# Patient Record
Sex: FEMALE | Race: WHITE | Employment: OTHER | URBAN - METROPOLITAN AREA
[De-identification: names, ages, dates, MRNs, and addresses within clinical notes are randomized per-mention and may not be internally consistent; named-entity substitution may affect disease eponyms.]

---

## 2019-12-18 NOTE — IP AVS SNAPSHOT
[facility]            (For Outpatient Use Only) Initial Admit Date: 9/10/2021   Inpt/Obs Admit Date: Inpt: 9/10/21 / Obs: N/A   Discharge Date:    Shiloh Copier:  [de-identified]   MRN: [de-identified]   CSN: [id number]   CEID: [id number]        ENC Subscriber Name:  Bettye Mercer :    Subscriber ID:  Pt Rel to Subscriber:    Hospital Account Financial Class: Medicare    September 15, 2021 To get better and follow your care plan as instructed.

## 2021-01-01 ENCOUNTER — APPOINTMENT (OUTPATIENT)
Dept: GENERAL RADIOLOGY | Facility: HOSPITAL | Age: 86
DRG: 291 | End: 2021-01-01
Attending: EMERGENCY MEDICINE
Payer: MEDICARE

## 2021-01-01 ENCOUNTER — APPOINTMENT (OUTPATIENT)
Dept: CT IMAGING | Facility: HOSPITAL | Age: 86
End: 2021-01-01
Attending: EMERGENCY MEDICINE
Payer: MEDICARE

## 2021-01-01 ENCOUNTER — APPOINTMENT (OUTPATIENT)
Dept: CT IMAGING | Facility: HOSPITAL | Age: 86
DRG: 536 | End: 2021-01-01
Attending: EMERGENCY MEDICINE
Payer: MEDICARE

## 2021-01-01 ENCOUNTER — APPOINTMENT (OUTPATIENT)
Dept: GENERAL RADIOLOGY | Facility: HOSPITAL | Age: 86
DRG: 536 | End: 2021-01-01
Attending: EMERGENCY MEDICINE
Payer: MEDICARE

## 2021-01-01 ENCOUNTER — HOSPITAL ENCOUNTER (EMERGENCY)
Facility: HOSPITAL | Age: 86
Discharge: HOME | End: 2021-01-01
Attending: EMERGENCY MEDICINE
Payer: MEDICARE

## 2021-01-01 ENCOUNTER — HOSPITAL ENCOUNTER (INPATIENT)
Facility: HOSPITAL | Age: 86
LOS: 1 days | DRG: 291 | End: 2021-01-01
Attending: EMERGENCY MEDICINE | Admitting: INTERNAL MEDICINE
Payer: MEDICARE

## 2021-01-01 ENCOUNTER — APPOINTMENT (OUTPATIENT)
Dept: CT IMAGING | Facility: HOSPITAL | Age: 86
DRG: 291 | End: 2021-01-01
Attending: EMERGENCY MEDICINE
Payer: MEDICARE

## 2021-01-01 ENCOUNTER — HOSPITAL ENCOUNTER (INPATIENT)
Facility: HOSPITAL | Age: 86
LOS: 5 days | Discharge: NURSING, REHAB OR LONG-TERM CARE | DRG: 291 | End: 2021-01-01
Attending: EMERGENCY MEDICINE | Admitting: HOSPITALIST
Payer: MEDICARE

## 2021-01-01 ENCOUNTER — HOSPITAL ENCOUNTER (INPATIENT)
Facility: HOSPITAL | Age: 86
LOS: 4 days | Discharge: NURSING, REHAB OR LONG-TERM CARE | DRG: 536 | End: 2021-01-01
Attending: EMERGENCY MEDICINE | Admitting: HOSPITALIST
Payer: MEDICARE

## 2021-01-01 VITALS
BODY MASS INDEX: 28.46 KG/M2 | RESPIRATION RATE: 16 BRPM | WEIGHT: 141.19 LBS | TEMPERATURE: 99 F | HEIGHT: 59 IN | SYSTOLIC BLOOD PRESSURE: 137 MMHG | HEART RATE: 107 BPM | DIASTOLIC BLOOD PRESSURE: 73 MMHG | OXYGEN SATURATION: 93 %

## 2021-01-01 VITALS
SYSTOLIC BLOOD PRESSURE: 130 MMHG | TEMPERATURE: 98 F | RESPIRATION RATE: 20 BRPM | DIASTOLIC BLOOD PRESSURE: 71 MMHG | OXYGEN SATURATION: 93 % | HEART RATE: 80 BPM

## 2021-01-01 VITALS
BODY MASS INDEX: 30 KG/M2 | WEIGHT: 146.5 LBS | RESPIRATION RATE: 16 BRPM | TEMPERATURE: 98 F | HEART RATE: 70 BPM | SYSTOLIC BLOOD PRESSURE: 146 MMHG | DIASTOLIC BLOOD PRESSURE: 76 MMHG | OXYGEN SATURATION: 96 %

## 2021-01-01 VITALS
WEIGHT: 135 LBS | TEMPERATURE: 98 F | BODY MASS INDEX: 27.21 KG/M2 | OXYGEN SATURATION: 98 % | SYSTOLIC BLOOD PRESSURE: 151 MMHG | RESPIRATION RATE: 20 BRPM | HEIGHT: 59 IN | DIASTOLIC BLOOD PRESSURE: 71 MMHG | HEART RATE: 71 BPM

## 2021-01-01 DIAGNOSIS — N39.0 URINARY TRACT INFECTION WITHOUT HEMATURIA, SITE UNSPECIFIED: ICD-10-CM

## 2021-01-01 DIAGNOSIS — R09.02 HYPOXIA: Primary | ICD-10-CM

## 2021-01-01 DIAGNOSIS — N28.9 ACUTE ON CHRONIC RENAL INSUFFICIENCY: ICD-10-CM

## 2021-01-01 DIAGNOSIS — S32.509A CLOSED FRACTURE OF PUBIS, UNSPECIFIED PORTION OF PUBIS, UNSPECIFIED LATERALITY, INITIAL ENCOUNTER (HCC): Primary | ICD-10-CM

## 2021-01-01 DIAGNOSIS — I50.9 ACUTE ON CHRONIC CONGESTIVE HEART FAILURE, UNSPECIFIED HEART FAILURE TYPE (HCC): ICD-10-CM

## 2021-01-01 DIAGNOSIS — D64.9 ANEMIA, UNSPECIFIED TYPE: ICD-10-CM

## 2021-01-01 DIAGNOSIS — N18.9 ACUTE ON CHRONIC RENAL INSUFFICIENCY: ICD-10-CM

## 2021-01-01 DIAGNOSIS — J96.21 ACUTE ON CHRONIC RESPIRATORY FAILURE WITH HYPOXIA (HCC): Primary | ICD-10-CM

## 2021-01-01 DIAGNOSIS — J90 PLEURAL EFFUSION: ICD-10-CM

## 2021-01-01 DIAGNOSIS — R31.9 HEMATURIA, UNSPECIFIED TYPE: ICD-10-CM

## 2021-01-01 DIAGNOSIS — S70.01XA CONTUSION OF RIGHT HIP, INITIAL ENCOUNTER: ICD-10-CM

## 2021-01-01 DIAGNOSIS — N30.91 HEMORRHAGIC CYSTITIS: Primary | ICD-10-CM

## 2021-01-01 LAB
ALBUMIN SERPL-MCNC: 2.7 G/DL (ref 3.4–5)
ALBUMIN SERPL-MCNC: 3.1 G/DL (ref 3.4–5)
ALBUMIN/GLOB SERPL: 0.7 {RATIO} (ref 1–2)
ALBUMIN/GLOB SERPL: 0.7 {RATIO} (ref 1–2)
ALP LIVER SERPL-CCNC: 101 U/L
ALP LIVER SERPL-CCNC: 302 U/L
ALT SERPL-CCNC: 15 U/L
ALT SERPL-CCNC: 32 U/L
ANION GAP SERPL CALC-SCNC: 2 MMOL/L (ref 0–18)
ANION GAP SERPL CALC-SCNC: 3 MMOL/L (ref 0–18)
ANION GAP SERPL CALC-SCNC: 4 MMOL/L (ref 0–18)
ANION GAP SERPL CALC-SCNC: 5 MMOL/L (ref 0–18)
ANION GAP SERPL CALC-SCNC: 6 MMOL/L (ref 0–18)
ANION GAP SERPL CALC-SCNC: 7 MMOL/L (ref 0–18)
ANION GAP SERPL CALC-SCNC: 8 MMOL/L (ref 0–18)
ANION GAP SERPL CALC-SCNC: 9 MMOL/L (ref 0–18)
ANTIBODY SCREEN: NEGATIVE
AST SERPL-CCNC: 18 U/L (ref 15–37)
AST SERPL-CCNC: 31 U/L (ref 15–37)
BASOPHILS # BLD AUTO: 0.03 X10(3) UL (ref 0–0.2)
BASOPHILS # BLD AUTO: 0.04 X10(3) UL (ref 0–0.2)
BASOPHILS # BLD AUTO: 0.05 X10(3) UL (ref 0–0.2)
BASOPHILS NFR BLD AUTO: 0.4 %
BASOPHILS NFR BLD AUTO: 0.5 %
BASOPHILS NFR BLD AUTO: 0.6 %
BASOPHILS NFR BLD AUTO: 0.6 %
BASOPHILS NFR BLD AUTO: 0.9 %
BILIRUB SERPL-MCNC: 0.3 MG/DL (ref 0.1–2)
BILIRUB SERPL-MCNC: 0.4 MG/DL (ref 0.1–2)
BILIRUB UR QL: NEGATIVE
BLOOD TYPE BARCODE: 7300
BLOOD TYPE BARCODE: 7300
BUN BLD-MCNC: 101 MG/DL (ref 7–18)
BUN BLD-MCNC: 116 MG/DL (ref 7–18)
BUN BLD-MCNC: 124 MG/DL (ref 7–18)
BUN BLD-MCNC: 139 MG/DL (ref 7–18)
BUN BLD-MCNC: 41 MG/DL (ref 7–18)
BUN BLD-MCNC: 43 MG/DL (ref 7–18)
BUN BLD-MCNC: 46 MG/DL (ref 7–18)
BUN BLD-MCNC: 49 MG/DL (ref 7–18)
BUN BLD-MCNC: 51 MG/DL (ref 7–18)
BUN BLD-MCNC: 52 MG/DL (ref 7–18)
BUN BLD-MCNC: 75 MG/DL (ref 7–18)
BUN BLD-MCNC: 94 MG/DL (ref 7–18)
BUN/CREAT SERPL: 30.8 (ref 10–20)
BUN/CREAT SERPL: 30.9 (ref 10–20)
BUN/CREAT SERPL: 31.5 (ref 10–20)
BUN/CREAT SERPL: 33.5 (ref 10–20)
BUN/CREAT SERPL: 34 (ref 10–20)
BUN/CREAT SERPL: 34.7 (ref 10–20)
BUN/CREAT SERPL: 44.9 (ref 10–20)
BUN/CREAT SERPL: 47.9 (ref 10–20)
BUN/CREAT SERPL: 52.5 (ref 10–20)
BUN/CREAT SERPL: 53.2 (ref 10–20)
BUN/CREAT SERPL: 55.6 (ref 10–20)
BUN/CREAT SERPL: 56.9 (ref 10–20)
CALCIUM BLD-MCNC: 7.9 MG/DL (ref 8.5–10.1)
CALCIUM BLD-MCNC: 8 MG/DL (ref 8.5–10.1)
CALCIUM BLD-MCNC: 8.1 MG/DL (ref 8.5–10.1)
CALCIUM BLD-MCNC: 8.1 MG/DL (ref 8.5–10.1)
CALCIUM BLD-MCNC: 8.3 MG/DL (ref 8.5–10.1)
CALCIUM BLD-MCNC: 8.4 MG/DL (ref 8.5–10.1)
CALCIUM BLD-MCNC: 8.4 MG/DL (ref 8.5–10.1)
CALCIUM BLD-MCNC: 8.5 MG/DL (ref 8.5–10.1)
CALCIUM BLD-MCNC: 8.7 MG/DL (ref 8.5–10.1)
CALCIUM BLD-MCNC: 8.8 MG/DL (ref 8.5–10.1)
CHLORIDE SERPL-SCNC: 101 MMOL/L (ref 98–112)
CHLORIDE SERPL-SCNC: 104 MMOL/L (ref 98–112)
CHLORIDE SERPL-SCNC: 105 MMOL/L (ref 98–112)
CHLORIDE SERPL-SCNC: 105 MMOL/L (ref 98–112)
CHLORIDE SERPL-SCNC: 107 MMOL/L (ref 98–112)
CHLORIDE SERPL-SCNC: 108 MMOL/L (ref 98–112)
CHLORIDE SERPL-SCNC: 93 MMOL/L (ref 98–112)
CHLORIDE SERPL-SCNC: 97 MMOL/L (ref 98–112)
CLARITY UR: CLEAR
CO2 SERPL-SCNC: 23 MMOL/L (ref 21–32)
CO2 SERPL-SCNC: 27 MMOL/L (ref 21–32)
CO2 SERPL-SCNC: 30 MMOL/L (ref 21–32)
CO2 SERPL-SCNC: 31 MMOL/L (ref 21–32)
CO2 SERPL-SCNC: 32 MMOL/L (ref 21–32)
CO2 SERPL-SCNC: 32 MMOL/L (ref 21–32)
CO2 SERPL-SCNC: 33 MMOL/L (ref 21–32)
CO2 SERPL-SCNC: 34 MMOL/L (ref 21–32)
CO2 SERPL-SCNC: 34 MMOL/L (ref 21–32)
CO2 SERPL-SCNC: 35 MMOL/L (ref 21–32)
COLOR UR: YELLOW
COLOR UR: YELLOW
CREAT BLD-MCNC: 1.24 MG/DL
CREAT BLD-MCNC: 1.33 MG/DL
CREAT BLD-MCNC: 1.44 MG/DL
CREAT BLD-MCNC: 1.49 MG/DL
CREAT BLD-MCNC: 1.55 MG/DL
CREAT BLD-MCNC: 1.62 MG/DL
CREAT BLD-MCNC: 1.67 MG/DL
CREAT BLD-MCNC: 1.79 MG/DL
CREAT BLD-MCNC: 2.11 MG/DL
CREAT BLD-MCNC: 2.18 MG/DL
CREAT BLD-MCNC: 2.18 MG/DL
CREAT BLD-MCNC: 2.5 MG/DL
DEPRECATED RDW RBC AUTO: 60.3 FL (ref 35.1–46.3)
DEPRECATED RDW RBC AUTO: 60.8 FL (ref 35.1–46.3)
DEPRECATED RDW RBC AUTO: 61.2 FL (ref 35.1–46.3)
DEPRECATED RDW RBC AUTO: 62.5 FL (ref 35.1–46.3)
DEPRECATED RDW RBC AUTO: 63.5 FL (ref 35.1–46.3)
DEPRECATED RDW RBC AUTO: 66.4 FL (ref 35.1–46.3)
DEPRECATED RDW RBC AUTO: 71.6 FL (ref 35.1–46.3)
DEPRECATED RDW RBC AUTO: 73.8 FL (ref 35.1–46.3)
DEPRECATED RDW RBC AUTO: 74.9 FL (ref 35.1–46.3)
DEPRECATED RDW RBC AUTO: 78.6 FL (ref 35.1–46.3)
DEPRECATED RDW RBC AUTO: 78.9 FL (ref 35.1–46.3)
EOSINOPHIL # BLD AUTO: 0.02 X10(3) UL (ref 0–0.7)
EOSINOPHIL # BLD AUTO: 0.03 X10(3) UL (ref 0–0.7)
EOSINOPHIL # BLD AUTO: 0.07 X10(3) UL (ref 0–0.7)
EOSINOPHIL # BLD AUTO: 0.09 X10(3) UL (ref 0–0.7)
EOSINOPHIL # BLD AUTO: 0.14 X10(3) UL (ref 0–0.7)
EOSINOPHIL # BLD AUTO: 0.14 X10(3) UL (ref 0–0.7)
EOSINOPHIL # BLD AUTO: 0.19 X10(3) UL (ref 0–0.7)
EOSINOPHIL # BLD AUTO: 0.21 X10(3) UL (ref 0–0.7)
EOSINOPHIL NFR BLD AUTO: 0.2 %
EOSINOPHIL NFR BLD AUTO: 0.4 %
EOSINOPHIL NFR BLD AUTO: 1 %
EOSINOPHIL NFR BLD AUTO: 1.2 %
EOSINOPHIL NFR BLD AUTO: 1.9 %
EOSINOPHIL NFR BLD AUTO: 2.1 %
EOSINOPHIL NFR BLD AUTO: 3.2 %
EOSINOPHIL NFR BLD AUTO: 3.6 %
ERYTHROCYTE [DISTWIDTH] IN BLOOD BY AUTOMATED COUNT: 18.6 % (ref 11–15)
ERYTHROCYTE [DISTWIDTH] IN BLOOD BY AUTOMATED COUNT: 18.8 % (ref 11–15)
ERYTHROCYTE [DISTWIDTH] IN BLOOD BY AUTOMATED COUNT: 18.8 % (ref 11–15)
ERYTHROCYTE [DISTWIDTH] IN BLOOD BY AUTOMATED COUNT: 18.9 % (ref 11–15)
ERYTHROCYTE [DISTWIDTH] IN BLOOD BY AUTOMATED COUNT: 19.4 % (ref 11–15)
ERYTHROCYTE [DISTWIDTH] IN BLOOD BY AUTOMATED COUNT: 19.5 % (ref 11–15)
ERYTHROCYTE [DISTWIDTH] IN BLOOD BY AUTOMATED COUNT: 19.9 % (ref 11–15)
ERYTHROCYTE [DISTWIDTH] IN BLOOD BY AUTOMATED COUNT: 20.2 % (ref 11–15)
ERYTHROCYTE [DISTWIDTH] IN BLOOD BY AUTOMATED COUNT: 20.9 % (ref 11–15)
ERYTHROCYTE [DISTWIDTH] IN BLOOD BY AUTOMATED COUNT: 20.9 % (ref 11–15)
ERYTHROCYTE [DISTWIDTH] IN BLOOD BY AUTOMATED COUNT: 21.1 % (ref 11–15)
EST. AVERAGE GLUCOSE BLD GHB EST-MCNC: 189 MG/DL (ref 68–126)
EST. AVERAGE GLUCOSE BLD GHB EST-MCNC: 189 MG/DL (ref 68–126)
GLOBULIN PLAS-MCNC: 4.1 G/DL (ref 2.8–4.4)
GLOBULIN PLAS-MCNC: 4.7 G/DL (ref 2.8–4.4)
GLUCOSE BLD-MCNC: 116 MG/DL (ref 70–99)
GLUCOSE BLD-MCNC: 128 MG/DL (ref 70–99)
GLUCOSE BLD-MCNC: 129 MG/DL (ref 70–99)
GLUCOSE BLD-MCNC: 134 MG/DL (ref 70–99)
GLUCOSE BLD-MCNC: 141 MG/DL (ref 70–99)
GLUCOSE BLD-MCNC: 150 MG/DL (ref 70–99)
GLUCOSE BLD-MCNC: 152 MG/DL (ref 70–99)
GLUCOSE BLD-MCNC: 159 MG/DL (ref 70–99)
GLUCOSE BLD-MCNC: 174 MG/DL (ref 70–99)
GLUCOSE BLD-MCNC: 181 MG/DL (ref 70–99)
GLUCOSE BLD-MCNC: 225 MG/DL (ref 70–99)
GLUCOSE BLD-MCNC: 267 MG/DL (ref 70–99)
GLUCOSE BLDC GLUCOMTR-MCNC: 118 MG/DL (ref 70–99)
GLUCOSE BLDC GLUCOMTR-MCNC: 125 MG/DL (ref 70–99)
GLUCOSE BLDC GLUCOMTR-MCNC: 126 MG/DL (ref 70–99)
GLUCOSE BLDC GLUCOMTR-MCNC: 130 MG/DL (ref 70–99)
GLUCOSE BLDC GLUCOMTR-MCNC: 131 MG/DL (ref 70–99)
GLUCOSE BLDC GLUCOMTR-MCNC: 131 MG/DL (ref 70–99)
GLUCOSE BLDC GLUCOMTR-MCNC: 134 MG/DL (ref 70–99)
GLUCOSE BLDC GLUCOMTR-MCNC: 143 MG/DL (ref 70–99)
GLUCOSE BLDC GLUCOMTR-MCNC: 144 MG/DL (ref 70–99)
GLUCOSE BLDC GLUCOMTR-MCNC: 155 MG/DL (ref 70–99)
GLUCOSE BLDC GLUCOMTR-MCNC: 164 MG/DL (ref 70–99)
GLUCOSE BLDC GLUCOMTR-MCNC: 166 MG/DL (ref 70–99)
GLUCOSE BLDC GLUCOMTR-MCNC: 175 MG/DL (ref 70–99)
GLUCOSE BLDC GLUCOMTR-MCNC: 193 MG/DL (ref 70–99)
GLUCOSE BLDC GLUCOMTR-MCNC: 194 MG/DL (ref 70–99)
GLUCOSE BLDC GLUCOMTR-MCNC: 195 MG/DL (ref 70–99)
GLUCOSE BLDC GLUCOMTR-MCNC: 195 MG/DL (ref 70–99)
GLUCOSE BLDC GLUCOMTR-MCNC: 209 MG/DL (ref 70–99)
GLUCOSE BLDC GLUCOMTR-MCNC: 217 MG/DL (ref 70–99)
GLUCOSE BLDC GLUCOMTR-MCNC: 219 MG/DL (ref 70–99)
GLUCOSE BLDC GLUCOMTR-MCNC: 224 MG/DL (ref 70–99)
GLUCOSE BLDC GLUCOMTR-MCNC: 226 MG/DL (ref 70–99)
GLUCOSE BLDC GLUCOMTR-MCNC: 227 MG/DL (ref 70–99)
GLUCOSE BLDC GLUCOMTR-MCNC: 232 MG/DL (ref 70–99)
GLUCOSE BLDC GLUCOMTR-MCNC: 237 MG/DL (ref 70–99)
GLUCOSE BLDC GLUCOMTR-MCNC: 237 MG/DL (ref 70–99)
GLUCOSE BLDC GLUCOMTR-MCNC: 251 MG/DL (ref 70–99)
GLUCOSE BLDC GLUCOMTR-MCNC: 252 MG/DL (ref 70–99)
GLUCOSE BLDC GLUCOMTR-MCNC: 258 MG/DL (ref 70–99)
GLUCOSE BLDC GLUCOMTR-MCNC: 260 MG/DL (ref 70–99)
GLUCOSE BLDC GLUCOMTR-MCNC: 279 MG/DL (ref 70–99)
GLUCOSE BLDC GLUCOMTR-MCNC: 286 MG/DL (ref 70–99)
GLUCOSE BLDC GLUCOMTR-MCNC: 291 MG/DL (ref 70–99)
GLUCOSE BLDC GLUCOMTR-MCNC: 321 MG/DL (ref 70–99)
GLUCOSE BLDC GLUCOMTR-MCNC: 351 MG/DL (ref 70–99)
GLUCOSE BLDC GLUCOMTR-MCNC: 355 MG/DL (ref 70–99)
GLUCOSE BLDC GLUCOMTR-MCNC: 379 MG/DL (ref 70–99)
GLUCOSE UR-MCNC: 50 MG/DL
GLUCOSE UR-MCNC: 50 MG/DL
GLUCOSE UR-MCNC: NEGATIVE MG/DL
HAV IGM SER QL: 2.3 MG/DL (ref 1.6–2.6)
HBA1C MFR BLD HPLC: 8.2 % (ref ?–5.7)
HBA1C MFR BLD HPLC: 8.2 % (ref ?–5.7)
HCT VFR BLD AUTO: 21.8 %
HCT VFR BLD AUTO: 23.3 %
HCT VFR BLD AUTO: 25.7 %
HCT VFR BLD AUTO: 28.3 %
HCT VFR BLD AUTO: 28.3 %
HCT VFR BLD AUTO: 29.4 %
HCT VFR BLD AUTO: 29.7 %
HCT VFR BLD AUTO: 31.3 %
HCT VFR BLD AUTO: 31.3 %
HCT VFR BLD AUTO: 31.6 %
HCT VFR BLD AUTO: 32.5 %
HEMOCCULT STL QL: NEGATIVE
HGB BLD-MCNC: 6.2 G/DL
HGB BLD-MCNC: 6.4 G/DL
HGB BLD-MCNC: 7 G/DL
HGB BLD-MCNC: 7 G/DL
HGB BLD-MCNC: 7.6 G/DL
HGB BLD-MCNC: 8 G/DL
HGB BLD-MCNC: 8.4 G/DL
HGB BLD-MCNC: 8.5 G/DL
HGB BLD-MCNC: 8.6 G/DL
HGB BLD-MCNC: 8.6 G/DL
HGB BLD-MCNC: 8.7 G/DL
HGB BLD-MCNC: 8.9 G/DL
HGB BLD-MCNC: 9.2 G/DL
HGB BLD-MCNC: 9.2 G/DL
HGB BLD-MCNC: 9.3 G/DL
HGB BLD-MCNC: 9.4 G/DL
IMM GRANULOCYTES # BLD AUTO: 0.01 X10(3) UL (ref 0–1)
IMM GRANULOCYTES # BLD AUTO: 0.02 X10(3) UL (ref 0–1)
IMM GRANULOCYTES # BLD AUTO: 0.03 X10(3) UL (ref 0–1)
IMM GRANULOCYTES # BLD AUTO: 0.05 X10(3) UL (ref 0–1)
IMM GRANULOCYTES NFR BLD: 0.2 %
IMM GRANULOCYTES NFR BLD: 0.3 %
IMM GRANULOCYTES NFR BLD: 0.4 %
IMM GRANULOCYTES NFR BLD: 0.5 %
KETONES UR-MCNC: NEGATIVE MG/DL
LACTATE SERPL-SCNC: 0.8 MMOL/L (ref 0.4–2)
LYMPHOCYTES # BLD AUTO: 0.51 X10(3) UL (ref 1–4)
LYMPHOCYTES # BLD AUTO: 0.77 X10(3) UL (ref 1–4)
LYMPHOCYTES # BLD AUTO: 0.77 X10(3) UL (ref 1–4)
LYMPHOCYTES # BLD AUTO: 0.78 X10(3) UL (ref 1–4)
LYMPHOCYTES # BLD AUTO: 0.82 X10(3) UL (ref 1–4)
LYMPHOCYTES # BLD AUTO: 0.85 X10(3) UL (ref 1–4)
LYMPHOCYTES # BLD AUTO: 0.94 X10(3) UL (ref 1–4)
LYMPHOCYTES # BLD AUTO: 1.15 X10(3) UL (ref 1–4)
LYMPHOCYTES NFR BLD AUTO: 11 %
LYMPHOCYTES NFR BLD AUTO: 11.1 %
LYMPHOCYTES NFR BLD AUTO: 11.2 %
LYMPHOCYTES NFR BLD AUTO: 11.8 %
LYMPHOCYTES NFR BLD AUTO: 13.1 %
LYMPHOCYTES NFR BLD AUTO: 16 %
LYMPHOCYTES NFR BLD AUTO: 16.9 %
LYMPHOCYTES NFR BLD AUTO: 4.8 %
M PROTEIN MFR SERPL ELPH: 6.8 G/DL (ref 6.4–8.2)
M PROTEIN MFR SERPL ELPH: 7.8 G/DL (ref 6.4–8.2)
MAGNESIUM SERPL-MCNC: 2.4 MG/DL (ref 1.6–2.6)
MAGNESIUM SERPL-MCNC: 2.4 MG/DL (ref 1.6–2.6)
MAGNESIUM SERPL-MCNC: 2.5 MG/DL (ref 1.6–2.6)
MAGNESIUM SERPL-MCNC: 2.6 MG/DL (ref 1.6–2.6)
MCH RBC QN AUTO: 26.6 PG (ref 26–34)
MCH RBC QN AUTO: 26.8 PG (ref 26–34)
MCH RBC QN AUTO: 26.9 PG (ref 26–34)
MCH RBC QN AUTO: 26.9 PG (ref 26–34)
MCH RBC QN AUTO: 27.2 PG (ref 26–34)
MCH RBC QN AUTO: 27.4 PG (ref 26–34)
MCH RBC QN AUTO: 28.2 PG (ref 26–34)
MCH RBC QN AUTO: 29.2 PG (ref 26–34)
MCH RBC QN AUTO: 29.3 PG (ref 26–34)
MCH RBC QN AUTO: 29.3 PG (ref 26–34)
MCH RBC QN AUTO: 29.7 PG (ref 26–34)
MCHC RBC AUTO-ENTMCNC: 28.9 G/DL (ref 31–37)
MCHC RBC AUTO-ENTMCNC: 29.1 G/DL (ref 31–37)
MCHC RBC AUTO-ENTMCNC: 29.3 G/DL (ref 31–37)
MCHC RBC AUTO-ENTMCNC: 29.3 G/DL (ref 31–37)
MCHC RBC AUTO-ENTMCNC: 29.4 G/DL (ref 31–37)
MCHC RBC AUTO-ENTMCNC: 29.4 G/DL (ref 31–37)
MCHC RBC AUTO-ENTMCNC: 29.6 G/DL (ref 31–37)
MCHC RBC AUTO-ENTMCNC: 29.7 G/DL (ref 31–37)
MCHC RBC AUTO-ENTMCNC: 29.7 G/DL (ref 31–37)
MCHC RBC AUTO-ENTMCNC: 30 G/DL (ref 31–37)
MCHC RBC AUTO-ENTMCNC: 30.4 G/DL (ref 31–37)
MCV RBC AUTO: 100.9 FL
MCV RBC AUTO: 101.9 FL
MCV RBC AUTO: 89.6 FL
MCV RBC AUTO: 90.1 FL
MCV RBC AUTO: 90.5 FL
MCV RBC AUTO: 90.5 FL
MCV RBC AUTO: 91.6 FL
MCV RBC AUTO: 91.9 FL
MCV RBC AUTO: 96.4 FL
MCV RBC AUTO: 98.7 FL
MCV RBC AUTO: 99.7 FL
MONOCYTES # BLD AUTO: 0.69 X10(3) UL (ref 0.1–1)
MONOCYTES # BLD AUTO: 0.78 X10(3) UL (ref 0.1–1)
MONOCYTES # BLD AUTO: 0.79 X10(3) UL (ref 0.1–1)
MONOCYTES # BLD AUTO: 0.81 X10(3) UL (ref 0.1–1)
MONOCYTES # BLD AUTO: 0.82 X10(3) UL (ref 0.1–1)
MONOCYTES # BLD AUTO: 0.84 X10(3) UL (ref 0.1–1)
MONOCYTES # BLD AUTO: 0.94 X10(3) UL (ref 0.1–1)
MONOCYTES # BLD AUTO: 0.96 X10(3) UL (ref 0.1–1)
MONOCYTES NFR BLD AUTO: 10.9 %
MONOCYTES NFR BLD AUTO: 11.9 %
MONOCYTES NFR BLD AUTO: 12.1 %
MONOCYTES NFR BLD AUTO: 12.7 %
MONOCYTES NFR BLD AUTO: 13.3 %
MONOCYTES NFR BLD AUTO: 14 %
MONOCYTES NFR BLD AUTO: 9 %
MONOCYTES NFR BLD AUTO: 9.9 %
NEUTROPHILS # BLD AUTO: 3.89 X10 (3) UL (ref 1.5–7.7)
NEUTROPHILS # BLD AUTO: 3.89 X10(3) UL (ref 1.5–7.7)
NEUTROPHILS # BLD AUTO: 4.02 X10 (3) UL (ref 1.5–7.7)
NEUTROPHILS # BLD AUTO: 4.02 X10(3) UL (ref 1.5–7.7)
NEUTROPHILS # BLD AUTO: 4.64 X10 (3) UL (ref 1.5–7.7)
NEUTROPHILS # BLD AUTO: 4.64 X10(3) UL (ref 1.5–7.7)
NEUTROPHILS # BLD AUTO: 5.25 X10 (3) UL (ref 1.5–7.7)
NEUTROPHILS # BLD AUTO: 5.25 X10(3) UL (ref 1.5–7.7)
NEUTROPHILS # BLD AUTO: 5.36 X10 (3) UL (ref 1.5–7.7)
NEUTROPHILS # BLD AUTO: 5.36 X10(3) UL (ref 1.5–7.7)
NEUTROPHILS # BLD AUTO: 5.38 X10 (3) UL (ref 1.5–7.7)
NEUTROPHILS # BLD AUTO: 5.38 X10(3) UL (ref 1.5–7.7)
NEUTROPHILS # BLD AUTO: 5.52 X10 (3) UL (ref 1.5–7.7)
NEUTROPHILS # BLD AUTO: 5.52 X10(3) UL (ref 1.5–7.7)
NEUTROPHILS # BLD AUTO: 9.11 X10 (3) UL (ref 1.5–7.7)
NEUTROPHILS # BLD AUTO: 9.11 X10(3) UL (ref 1.5–7.7)
NEUTROPHILS NFR BLD AUTO: 66.4 %
NEUTROPHILS NFR BLD AUTO: 68.2 %
NEUTROPHILS NFR BLD AUTO: 68.5 %
NEUTROPHILS NFR BLD AUTO: 74.3 %
NEUTROPHILS NFR BLD AUTO: 74.4 %
NEUTROPHILS NFR BLD AUTO: 75.7 %
NEUTROPHILS NFR BLD AUTO: 77.1 %
NEUTROPHILS NFR BLD AUTO: 85.1 %
NITRITE UR QL STRIP.AUTO: NEGATIVE
NITRITE UR QL STRIP.AUTO: NEGATIVE
NITRITE UR QL STRIP.AUTO: POSITIVE
NT-PROBNP SERPL-MCNC: 8125 PG/ML (ref ?–450)
OSMOLALITY SERPL CALC.SUM OF ELEC: 299 MOSM/KG (ref 275–295)
OSMOLALITY SERPL CALC.SUM OF ELEC: 305 MOSM/KG (ref 275–295)
OSMOLALITY SERPL CALC.SUM OF ELEC: 308 MOSM/KG (ref 275–295)
OSMOLALITY SERPL CALC.SUM OF ELEC: 309 MOSM/KG (ref 275–295)
OSMOLALITY SERPL CALC.SUM OF ELEC: 314 MOSM/KG (ref 275–295)
OSMOLALITY SERPL CALC.SUM OF ELEC: 316 MOSM/KG (ref 275–295)
OSMOLALITY SERPL CALC.SUM OF ELEC: 317 MOSM/KG (ref 275–295)
OSMOLALITY SERPL CALC.SUM OF ELEC: 319 MOSM/KG (ref 275–295)
OSMOLALITY SERPL CALC.SUM OF ELEC: 330 MOSM/KG (ref 275–295)
OSMOLALITY SERPL CALC.SUM OF ELEC: 332 MOSM/KG (ref 275–295)
PH UR: 6 [PH] (ref 5–8)
PH UR: 7 [PH] (ref 5–8)
PH UR: 7 [PH] (ref 5–8)
PLATELET # BLD AUTO: 226 10(3)UL (ref 150–450)
PLATELET # BLD AUTO: 229 10(3)UL (ref 150–450)
PLATELET # BLD AUTO: 258 10(3)UL (ref 150–450)
PLATELET # BLD AUTO: 263 10(3)UL (ref 150–450)
PLATELET # BLD AUTO: 268 10(3)UL (ref 150–450)
PLATELET # BLD AUTO: 315 10(3)UL (ref 150–450)
PLATELET # BLD AUTO: 324 10(3)UL (ref 150–450)
PLATELET # BLD AUTO: 335 10(3)UL (ref 150–450)
PLATELET # BLD AUTO: 337 10(3)UL (ref 150–450)
PLATELET # BLD AUTO: 372 10(3)UL (ref 150–450)
PLATELET # BLD AUTO: 434 10(3)UL (ref 150–450)
PLATELET MORPHOLOGY: NORMAL
POTASSIUM SERPL-SCNC: 3.4 MMOL/L (ref 3.5–5.1)
POTASSIUM SERPL-SCNC: 3.8 MMOL/L (ref 3.5–5.1)
POTASSIUM SERPL-SCNC: 4.3 MMOL/L (ref 3.5–5.1)
POTASSIUM SERPL-SCNC: 4.5 MMOL/L (ref 3.5–5.1)
POTASSIUM SERPL-SCNC: 4.6 MMOL/L (ref 3.5–5.1)
POTASSIUM SERPL-SCNC: 4.6 MMOL/L (ref 3.5–5.1)
POTASSIUM SERPL-SCNC: 4.7 MMOL/L (ref 3.5–5.1)
POTASSIUM SERPL-SCNC: 5.1 MMOL/L (ref 3.5–5.1)
POTASSIUM SERPL-SCNC: 5.1 MMOL/L (ref 3.5–5.1)
PROCALCITONIN SERPL-MCNC: 0.26 NG/ML (ref ?–0.16)
PROT UR-MCNC: 100 MG/DL
RBC # BLD AUTO: 2.41 X10(6)UL
RBC # BLD AUTO: 2.6 X10(6)UL
RBC # BLD AUTO: 2.84 X10(6)UL
RBC # BLD AUTO: 3.08 X10(6)UL
RBC # BLD AUTO: 3.09 X10(6)UL
RBC # BLD AUTO: 3.1 X10(6)UL
RBC # BLD AUTO: 3.14 X10(6)UL
RBC # BLD AUTO: 3.14 X10(6)UL
RBC # BLD AUTO: 3.17 X10(6)UL
RBC # BLD AUTO: 3.2 X10(6)UL
RBC # BLD AUTO: 3.22 X10(6)UL
RBC #/AREA URNS AUTO: >10 /HPF
RBC #/AREA URNS AUTO: >10 /HPF
RH BLOOD TYPE: POSITIVE
SARS-COV-2 RNA RESP QL NAA+PROBE: NOT DETECTED
SARS-COV-2 RNA RESP QL NAA+PROBE: NOT DETECTED
SODIUM SERPL-SCNC: 134 MMOL/L (ref 136–145)
SODIUM SERPL-SCNC: 136 MMOL/L (ref 136–145)
SODIUM SERPL-SCNC: 137 MMOL/L (ref 136–145)
SODIUM SERPL-SCNC: 138 MMOL/L (ref 136–145)
SODIUM SERPL-SCNC: 140 MMOL/L (ref 136–145)
SODIUM SERPL-SCNC: 141 MMOL/L (ref 136–145)
SODIUM SERPL-SCNC: 142 MMOL/L (ref 136–145)
SP GR UR STRIP: 1.01 (ref 1–1.03)
UROBILINOGEN UR STRIP-ACNC: <2
VIT B12 SERPL-MCNC: 1939 PG/ML (ref 193–986)
WBC # BLD AUTO: 10.7 X10(3) UL (ref 4–11)
WBC # BLD AUTO: 5.9 X10(3) UL (ref 4–11)
WBC # BLD AUTO: 5.9 X10(3) UL (ref 4–11)
WBC # BLD AUTO: 6.8 X10(3) UL (ref 4–11)
WBC # BLD AUTO: 6.9 X10(3) UL (ref 4–11)
WBC # BLD AUTO: 7 X10(3) UL (ref 4–11)
WBC # BLD AUTO: 7.1 X10(3) UL (ref 4–11)
WBC # BLD AUTO: 7.2 X10(3) UL (ref 4–11)
WBC # BLD AUTO: 7.4 X10(3) UL (ref 4–11)
WBC # BLD AUTO: 8.4 X10(3) UL (ref 4–11)
WBC # BLD AUTO: 9.2 X10(3) UL (ref 4–11)
WBC #/AREA URNS AUTO: >50 /HPF

## 2021-01-01 PROCEDURE — 87186 SC STD MICRODIL/AGAR DIL: CPT | Performed by: HOSPITALIST

## 2021-01-01 PROCEDURE — 85027 COMPLETE CBC AUTOMATED: CPT | Performed by: NURSE PRACTITIONER

## 2021-01-01 PROCEDURE — 36430 TRANSFUSION BLD/BLD COMPNT: CPT

## 2021-01-01 PROCEDURE — 80048 BASIC METABOLIC PNL TOTAL CA: CPT | Performed by: HOSPITALIST

## 2021-01-01 PROCEDURE — 85025 COMPLETE CBC W/AUTO DIFF WBC: CPT | Performed by: HOSPITALIST

## 2021-01-01 PROCEDURE — 73502 X-RAY EXAM HIP UNI 2-3 VIEWS: CPT | Performed by: EMERGENCY MEDICINE

## 2021-01-01 PROCEDURE — 83036 HEMOGLOBIN GLYCOSYLATED A1C: CPT | Performed by: HOSPITALIST

## 2021-01-01 PROCEDURE — 82962 GLUCOSE BLOOD TEST: CPT

## 2021-01-01 PROCEDURE — 87086 URINE CULTURE/COLONY COUNT: CPT | Performed by: EMERGENCY MEDICINE

## 2021-01-01 PROCEDURE — 97535 SELF CARE MNGMENT TRAINING: CPT

## 2021-01-01 PROCEDURE — 85018 HEMOGLOBIN: CPT | Performed by: HOSPITALIST

## 2021-01-01 PROCEDURE — 96374 THER/PROPH/DIAG INJ IV PUSH: CPT

## 2021-01-01 PROCEDURE — 97165 OT EVAL LOW COMPLEX 30 MIN: CPT

## 2021-01-01 PROCEDURE — 86850 RBC ANTIBODY SCREEN: CPT | Performed by: EMERGENCY MEDICINE

## 2021-01-01 PROCEDURE — 80048 BASIC METABOLIC PNL TOTAL CA: CPT | Performed by: EMERGENCY MEDICINE

## 2021-01-01 PROCEDURE — 99284 EMERGENCY DEPT VISIT MOD MDM: CPT

## 2021-01-01 PROCEDURE — 97530 THERAPEUTIC ACTIVITIES: CPT

## 2021-01-01 PROCEDURE — 71045 X-RAY EXAM CHEST 1 VIEW: CPT | Performed by: EMERGENCY MEDICINE

## 2021-01-01 PROCEDURE — 93005 ELECTROCARDIOGRAM TRACING: CPT

## 2021-01-01 PROCEDURE — 74176 CT ABD & PELVIS W/O CONTRAST: CPT | Performed by: EMERGENCY MEDICINE

## 2021-01-01 PROCEDURE — 99222 1ST HOSP IP/OBS MODERATE 55: CPT | Performed by: INTERNAL MEDICINE

## 2021-01-01 PROCEDURE — 86920 COMPATIBILITY TEST SPIN: CPT

## 2021-01-01 PROCEDURE — 81001 URINALYSIS AUTO W/SCOPE: CPT | Performed by: HOSPITALIST

## 2021-01-01 PROCEDURE — 83735 ASSAY OF MAGNESIUM: CPT | Performed by: HOSPITALIST

## 2021-01-01 PROCEDURE — 97162 PT EVAL MOD COMPLEX 30 MIN: CPT

## 2021-01-01 PROCEDURE — 97116 GAIT TRAINING THERAPY: CPT

## 2021-01-01 PROCEDURE — 85025 COMPLETE CBC W/AUTO DIFF WBC: CPT | Performed by: EMERGENCY MEDICINE

## 2021-01-01 PROCEDURE — 70450 CT HEAD/BRAIN W/O DYE: CPT | Performed by: EMERGENCY MEDICINE

## 2021-01-01 PROCEDURE — 84145 PROCALCITONIN (PCT): CPT | Performed by: EMERGENCY MEDICINE

## 2021-01-01 PROCEDURE — 97110 THERAPEUTIC EXERCISES: CPT

## 2021-01-01 PROCEDURE — 80053 COMPREHEN METABOLIC PANEL: CPT | Performed by: HOSPITALIST

## 2021-01-01 PROCEDURE — 70486 CT MAXILLOFACIAL W/O DYE: CPT | Performed by: EMERGENCY MEDICINE

## 2021-01-01 PROCEDURE — 80048 BASIC METABOLIC PNL TOTAL CA: CPT | Performed by: NURSE PRACTITIONER

## 2021-01-01 PROCEDURE — 86901 BLOOD TYPING SEROLOGIC RH(D): CPT | Performed by: EMERGENCY MEDICINE

## 2021-01-01 PROCEDURE — 96365 THER/PROPH/DIAG IV INF INIT: CPT

## 2021-01-01 PROCEDURE — 36415 COLL VENOUS BLD VENIPUNCTURE: CPT

## 2021-01-01 PROCEDURE — 85060 BLOOD SMEAR INTERPRETATION: CPT | Performed by: EMERGENCY MEDICINE

## 2021-01-01 PROCEDURE — 99285 EMERGENCY DEPT VISIT HI MDM: CPT

## 2021-01-01 PROCEDURE — 83735 ASSAY OF MAGNESIUM: CPT | Performed by: EMERGENCY MEDICINE

## 2021-01-01 PROCEDURE — 81001 URINALYSIS AUTO W/SCOPE: CPT | Performed by: EMERGENCY MEDICINE

## 2021-01-01 PROCEDURE — 93010 ELECTROCARDIOGRAM REPORT: CPT | Performed by: EMERGENCY MEDICINE

## 2021-01-01 PROCEDURE — 30233N1 TRANSFUSION OF NONAUTOLOGOUS RED BLOOD CELLS INTO PERIPHERAL VEIN, PERCUTANEOUS APPROACH: ICD-10-PCS | Performed by: EMERGENCY MEDICINE

## 2021-01-01 PROCEDURE — 87077 CULTURE AEROBIC IDENTIFY: CPT | Performed by: EMERGENCY MEDICINE

## 2021-01-01 PROCEDURE — 83880 ASSAY OF NATRIURETIC PEPTIDE: CPT | Performed by: EMERGENCY MEDICINE

## 2021-01-01 PROCEDURE — 82607 VITAMIN B-12: CPT | Performed by: NURSE PRACTITIONER

## 2021-01-01 PROCEDURE — 87086 URINE CULTURE/COLONY COUNT: CPT | Performed by: HOSPITALIST

## 2021-01-01 PROCEDURE — 80053 COMPREHEN METABOLIC PANEL: CPT | Performed by: EMERGENCY MEDICINE

## 2021-01-01 PROCEDURE — 83605 ASSAY OF LACTIC ACID: CPT | Performed by: EMERGENCY MEDICINE

## 2021-01-01 PROCEDURE — 73560 X-RAY EXAM OF KNEE 1 OR 2: CPT | Performed by: EMERGENCY MEDICINE

## 2021-01-01 PROCEDURE — 87040 BLOOD CULTURE FOR BACTERIA: CPT | Performed by: EMERGENCY MEDICINE

## 2021-01-01 PROCEDURE — 80048 BASIC METABOLIC PNL TOTAL CA: CPT | Performed by: INTERNAL MEDICINE

## 2021-01-01 PROCEDURE — 85014 HEMATOCRIT: CPT | Performed by: HOSPITALIST

## 2021-01-01 PROCEDURE — 87186 SC STD MICRODIL/AGAR DIL: CPT | Performed by: EMERGENCY MEDICINE

## 2021-01-01 PROCEDURE — 86900 BLOOD TYPING SEROLOGIC ABO: CPT | Performed by: EMERGENCY MEDICINE

## 2021-01-01 PROCEDURE — 82272 OCCULT BLD FECES 1-3 TESTS: CPT | Performed by: HOSPITALIST

## 2021-01-01 RX ORDER — ASPIRIN 81 MG/1
81 TABLET ORAL DAILY
Status: ON HOLD | COMMUNITY
Start: 2021-01-01 | End: 2021-01-01

## 2021-01-01 RX ORDER — METOLAZONE 2.5 MG/1
2.5 TABLET ORAL DAILY
Status: ON HOLD | COMMUNITY
Start: 2021-01-01 | End: 2021-01-01

## 2021-01-01 RX ORDER — ERGOCALCIFEROL (VITAMIN D2) 1250 MCG
50000 CAPSULE ORAL
COMMUNITY
Start: 2021-01-01

## 2021-01-01 RX ORDER — ATORVASTATIN CALCIUM 10 MG/1
10 TABLET, FILM COATED ORAL NIGHTLY
Status: DISCONTINUED | OUTPATIENT
Start: 2021-01-01 | End: 2021-11-04

## 2021-01-01 RX ORDER — HEPARIN SODIUM 5000 [USP'U]/ML
5000 INJECTION, SOLUTION INTRAVENOUS; SUBCUTANEOUS EVERY 8 HOURS
Status: DISCONTINUED | OUTPATIENT
Start: 2021-01-01 | End: 2021-01-01

## 2021-01-01 RX ORDER — ENOXAPARIN SODIUM 100 MG/ML
40 INJECTION SUBCUTANEOUS DAILY
Status: DISCONTINUED | OUTPATIENT
Start: 2021-01-01 | End: 2021-01-01 | Stop reason: WASHOUT

## 2021-01-01 RX ORDER — CHOLECALCIFEROL (VITAMIN D3) 125 MCG
1000 CAPSULE ORAL DAILY
Status: DISCONTINUED | OUTPATIENT
Start: 2021-01-01 | End: 2021-01-01 | Stop reason: WASHOUT

## 2021-01-01 RX ORDER — ACETAMINOPHEN 500 MG
1000 TABLET ORAL EVERY 8 HOURS
Status: DISCONTINUED | OUTPATIENT
Start: 2021-01-01 | End: 2021-01-01 | Stop reason: WASHOUT

## 2021-01-01 RX ORDER — ASPIRIN 81 MG/1
81 TABLET ORAL DAILY
COMMUNITY

## 2021-01-01 RX ORDER — INSULIN GLARGINE 100 [IU]/ML
8 INJECTION, SOLUTION SUBCUTANEOUS NIGHTLY
Status: ON HOLD | COMMUNITY
End: 2021-01-01

## 2021-01-01 RX ORDER — ONDANSETRON 2 MG/ML
4 INJECTION INTRAMUSCULAR; INTRAVENOUS EVERY 6 HOURS PRN
Status: DISCONTINUED | OUTPATIENT
Start: 2021-01-01 | End: 2021-11-04

## 2021-01-01 RX ORDER — LEVOTHYROXINE SODIUM 0.07 MG/1
75 TABLET ORAL
COMMUNITY
Start: 2021-01-01

## 2021-01-01 RX ORDER — BUMETANIDE 2 MG/1
3 TABLET ORAL DAILY
Qty: 1 TABLET | Refills: 0 | Status: SHIPPED | COMMUNITY
Start: 2021-01-01

## 2021-01-01 RX ORDER — ASPIRIN 81 MG/1
81 TABLET ORAL DAILY
Status: DISCONTINUED | OUTPATIENT
Start: 2021-01-01 | End: 2021-01-01

## 2021-01-01 RX ORDER — TRAMADOL HYDROCHLORIDE 50 MG/1
50 TABLET ORAL EVERY 6 HOURS PRN
Qty: 10 TABLET | Refills: 0 | Status: ON HOLD | OUTPATIENT
Start: 2021-01-01 | End: 2021-01-01

## 2021-01-01 RX ORDER — IPRATROPIUM BROMIDE AND ALBUTEROL SULFATE 2.5; .5 MG/3ML; MG/3ML
3 SOLUTION RESPIRATORY (INHALATION) EVERY 6 HOURS PRN
Status: DISCONTINUED | OUTPATIENT
Start: 2021-01-01 | End: 2021-11-04

## 2021-01-01 RX ORDER — ACETAMINOPHEN 500 MG
1000 TABLET ORAL EVERY 8 HOURS
Status: DISCONTINUED | OUTPATIENT
Start: 2021-01-01 | End: 2021-01-01

## 2021-01-01 RX ORDER — ACETAMINOPHEN 325 MG/1
650 TABLET ORAL EVERY 6 HOURS PRN
Status: DISCONTINUED | OUTPATIENT
Start: 2021-01-01 | End: 2021-01-01

## 2021-01-01 RX ORDER — CHOLECALCIFEROL (VITAMIN D3) 125 MCG
1000 CAPSULE ORAL
Status: DISCONTINUED | OUTPATIENT
Start: 2021-01-01 | End: 2021-01-01

## 2021-01-01 RX ORDER — INSULIN GLARGINE 100 [IU]/ML
8 INJECTION, SOLUTION SUBCUTANEOUS DAILY
Status: ON HOLD | COMMUNITY
Start: 2021-01-01 | End: 2021-01-01

## 2021-01-01 RX ORDER — INSULIN GLARGINE 100 [IU]/ML
8 INJECTION, SOLUTION SUBCUTANEOUS DAILY
Qty: 5 EACH | Refills: 0 | Status: SHIPPED | OUTPATIENT
Start: 2021-01-01

## 2021-01-01 RX ORDER — MELATONIN
325 2 TIMES DAILY WITH MEALS
Status: DISCONTINUED | OUTPATIENT
Start: 2021-01-01 | End: 2021-01-01 | Stop reason: WASHOUT

## 2021-01-01 RX ORDER — ATORVASTATIN CALCIUM 10 MG/1
10 TABLET, FILM COATED ORAL NIGHTLY
Status: DISCONTINUED | OUTPATIENT
Start: 2021-01-01 | End: 2021-01-01

## 2021-01-01 RX ORDER — LIDOCAINE 4 G/G
1 PATCH TOPICAL EVERY 24 HOURS
COMMUNITY

## 2021-01-01 RX ORDER — DEXTROSE MONOHYDRATE 25 G/50ML
50 INJECTION, SOLUTION INTRAVENOUS
Status: DISCONTINUED | OUTPATIENT
Start: 2021-01-01 | End: 2021-11-04

## 2021-01-01 RX ORDER — LATANOPROST 50 UG/ML
1 SOLUTION/ DROPS OPHTHALMIC NIGHTLY
Status: DISCONTINUED | OUTPATIENT
Start: 2021-01-01 | End: 2021-01-01

## 2021-01-01 RX ORDER — POLYETHYLENE GLYCOL 3350 17 G/17G
17 POWDER, FOR SOLUTION ORAL DAILY PRN
Status: DISCONTINUED | OUTPATIENT
Start: 2021-01-01 | End: 2021-01-01

## 2021-01-01 RX ORDER — MELATONIN
325 2 TIMES DAILY WITH MEALS
COMMUNITY

## 2021-01-01 RX ORDER — ERGOCALCIFEROL (VITAMIN D2) 1250 MCG
50000 CAPSULE ORAL
Status: DISCONTINUED | OUTPATIENT
Start: 2021-01-01 | End: 2021-01-01

## 2021-01-01 RX ORDER — SIMVASTATIN 20 MG
20 TABLET ORAL NIGHTLY
Status: ON HOLD | COMMUNITY
Start: 2021-01-01 | End: 2021-01-01

## 2021-01-01 RX ORDER — AZITHROMYCIN 250 MG/1
500 TABLET, FILM COATED ORAL
Status: DISCONTINUED | OUTPATIENT
Start: 2021-01-01 | End: 2021-11-04

## 2021-01-01 RX ORDER — MELATONIN
325 2 TIMES DAILY WITH MEALS
Status: DISCONTINUED | OUTPATIENT
Start: 2021-01-01 | End: 2021-01-01

## 2021-01-01 RX ORDER — LEVOTHYROXINE SODIUM 0.07 MG/1
75 TABLET ORAL
Status: DISCONTINUED | OUTPATIENT
Start: 2021-01-01 | End: 2021-01-01 | Stop reason: WASHOUT

## 2021-01-01 RX ORDER — ASPIRIN 81 MG
TABLET,CHEWABLE ORAL 2 TIMES DAILY PRN
COMMUNITY
Start: 2021-01-01

## 2021-01-01 RX ORDER — ONDANSETRON 2 MG/ML
INJECTION INTRAMUSCULAR; INTRAVENOUS
Status: COMPLETED
Start: 2021-01-01 | End: 2021-01-01

## 2021-01-01 RX ORDER — FUROSEMIDE 10 MG/ML
40 INJECTION INTRAMUSCULAR; INTRAVENOUS
Status: DISCONTINUED | OUTPATIENT
Start: 2021-01-01 | End: 2021-11-04

## 2021-01-01 RX ORDER — CHOLECALCIFEROL (VITAMIN D3) 125 MCG
1000 CAPSULE ORAL DAILY
Status: DISCONTINUED | OUTPATIENT
Start: 2021-01-01 | End: 2021-01-01

## 2021-01-01 RX ORDER — PANTOPRAZOLE SODIUM 40 MG/1
40 TABLET, DELAYED RELEASE ORAL
Status: DISCONTINUED | OUTPATIENT
Start: 2021-01-01 | End: 2021-01-01

## 2021-01-01 RX ORDER — POTASSIUM CHLORIDE 20 MEQ/1
40 TABLET, EXTENDED RELEASE ORAL ONCE
Status: COMPLETED | OUTPATIENT
Start: 2021-01-01 | End: 2021-01-01

## 2021-01-01 RX ORDER — FLUTICASONE PROPIONATE 50 MCG
1 SPRAY, SUSPENSION (ML) NASAL DAILY
Status: DISCONTINUED | OUTPATIENT
Start: 2021-01-01 | End: 2021-01-01 | Stop reason: WASHOUT

## 2021-01-01 RX ORDER — PREDNISONE 1 MG/1
5 TABLET ORAL DAILY
Status: ON HOLD | COMMUNITY
Start: 2021-01-01 | End: 2021-01-01

## 2021-01-01 RX ORDER — LISINOPRIL 5 MG/1
5 TABLET ORAL DAILY
Status: ON HOLD | COMMUNITY
Start: 2021-01-01 | End: 2021-01-01

## 2021-01-01 RX ORDER — LEVOTHYROXINE SODIUM 0.07 MG/1
75 TABLET ORAL
Status: DISCONTINUED | OUTPATIENT
Start: 2021-01-01 | End: 2021-01-01

## 2021-01-01 RX ORDER — LEVOFLOXACIN 250 MG/1
250 TABLET ORAL DAILY
COMMUNITY
End: 2021-01-01

## 2021-01-01 RX ORDER — BUMETANIDE 2 MG/1
2 TABLET ORAL 2 TIMES DAILY
Status: ON HOLD | COMMUNITY
Start: 2021-01-01 | End: 2021-01-01

## 2021-01-01 RX ORDER — CEPHALEXIN 500 MG/1
500 CAPSULE ORAL 2 TIMES DAILY
Qty: 14 CAPSULE | Refills: 0 | Status: SHIPPED | OUTPATIENT
Start: 2021-01-01 | End: 2021-01-01

## 2021-01-01 RX ORDER — ERGOCALCIFEROL (VITAMIN D2) 1250 MCG
50000 CAPSULE ORAL
Status: DISCONTINUED | OUTPATIENT
Start: 2021-01-01 | End: 2021-01-01 | Stop reason: WASHOUT

## 2021-01-01 RX ORDER — SODIUM CHLORIDE 9 MG/ML
INJECTION, SOLUTION INTRAVENOUS ONCE
Status: COMPLETED | OUTPATIENT
Start: 2021-01-01 | End: 2021-01-01

## 2021-01-01 RX ORDER — LATANOPROST 50 UG/ML
1 SOLUTION/ DROPS OPHTHALMIC NIGHTLY
Status: DISCONTINUED | OUTPATIENT
Start: 2021-01-01 | End: 2021-01-01 | Stop reason: WASHOUT

## 2021-01-01 RX ORDER — DEXTROSE MONOHYDRATE 25 G/50ML
50 INJECTION, SOLUTION INTRAVENOUS
Status: DISCONTINUED | OUTPATIENT
Start: 2021-01-01 | End: 2021-01-01

## 2021-01-01 RX ORDER — METOCLOPRAMIDE HYDROCHLORIDE 5 MG/ML
10 INJECTION INTRAMUSCULAR; INTRAVENOUS EVERY 8 HOURS PRN
Status: DISCONTINUED | OUTPATIENT
Start: 2021-01-01 | End: 2021-01-01

## 2021-01-01 RX ORDER — TRAMADOL HYDROCHLORIDE 50 MG/1
50 TABLET ORAL EVERY 6 HOURS PRN
Status: DISCONTINUED | OUTPATIENT
Start: 2021-01-01 | End: 2021-01-01

## 2021-01-01 RX ORDER — ONDANSETRON 2 MG/ML
4 INJECTION INTRAMUSCULAR; INTRAVENOUS EVERY 6 HOURS PRN
Status: DISCONTINUED | OUTPATIENT
Start: 2021-01-01 | End: 2021-01-01

## 2021-01-01 RX ORDER — ATORVASTATIN CALCIUM 10 MG/1
10 TABLET, FILM COATED ORAL NIGHTLY
COMMUNITY

## 2021-01-01 RX ORDER — MELATONIN
3 NIGHTLY PRN
Status: DISCONTINUED | OUTPATIENT
Start: 2021-01-01 | End: 2021-01-01

## 2021-01-01 RX ORDER — VANCOMYCIN/0.9 % SOD CHLORIDE 1.75 G/5
25 PLASTIC BAG, INJECTION (ML) INTRAVENOUS ONCE
Status: DISCONTINUED | OUTPATIENT
Start: 2021-01-01 | End: 2021-01-01

## 2021-01-01 RX ORDER — BUMETANIDE 0.25 MG/ML
3 INJECTION, SOLUTION INTRAMUSCULAR; INTRAVENOUS ONCE
Status: COMPLETED | OUTPATIENT
Start: 2021-01-01 | End: 2021-01-01

## 2021-01-01 RX ORDER — HEPARIN SODIUM 5000 [USP'U]/ML
5000 INJECTION, SOLUTION INTRAVENOUS; SUBCUTANEOUS EVERY 12 HOURS SCHEDULED
Status: DISCONTINUED | OUTPATIENT
Start: 2021-01-01 | End: 2021-01-01

## 2021-01-01 RX ORDER — BUMETANIDE 1 MG/1
3 TABLET ORAL DAILY
Status: DISCONTINUED | OUTPATIENT
Start: 2021-01-01 | End: 2021-01-01

## 2021-01-01 RX ORDER — FLUTICASONE PROPIONATE 50 MCG
1 SPRAY, SUSPENSION (ML) NASAL DAILY
COMMUNITY
Start: 2021-01-01

## 2021-01-01 RX ORDER — FLUTICASONE PROPIONATE 50 MCG
1 SPRAY, SUSPENSION (ML) NASAL DAILY
Status: DISCONTINUED | OUTPATIENT
Start: 2021-01-01 | End: 2021-01-01

## 2021-01-01 RX ORDER — BISACODYL 10 MG
10 SUPPOSITORY, RECTAL RECTAL
Status: DISCONTINUED | OUTPATIENT
Start: 2021-01-01 | End: 2021-01-01

## 2021-01-01 RX ORDER — BUMETANIDE 0.25 MG/ML
2 INJECTION, SOLUTION INTRAMUSCULAR; INTRAVENOUS
Status: DISCONTINUED | OUTPATIENT
Start: 2021-01-01 | End: 2021-01-01

## 2021-01-01 RX ORDER — ASPIRIN 81 MG/1
81 TABLET ORAL DAILY
Status: DISCONTINUED | OUTPATIENT
Start: 2021-01-01 | End: 2021-11-04

## 2021-01-01 RX ORDER — ACETAMINOPHEN 500 MG
1000 TABLET ORAL EVERY 8 HOURS
Qty: 1 TABLET | Refills: 0 | Status: SHIPPED | COMMUNITY
Start: 2021-01-01

## 2021-07-20 PROBLEM — S32.030A COMPRESSION FRACTURE OF L3 VERTEBRA (HCC): Status: ACTIVE | Noted: 2018-03-28

## 2021-07-20 PROBLEM — S32.509A CLOSED FRACTURE OF PUBIS (HCC): Status: ACTIVE | Noted: 2021-01-01

## 2021-07-20 PROBLEM — N18.4 STAGE 4 CHRONIC KIDNEY DISEASE (HCC): Status: ACTIVE | Noted: 2020-02-14

## 2021-07-20 PROBLEM — S32.509A: Status: ACTIVE | Noted: 2021-01-01

## 2021-07-20 PROBLEM — D64.9 ANEMIA, UNSPECIFIED TYPE: Status: ACTIVE | Noted: 2021-01-01

## 2021-07-20 PROBLEM — N32.89 BLADDER MASS: Status: ACTIVE | Noted: 2019-01-20

## 2021-07-20 PROBLEM — N39.0 URINARY TRACT INFECTION WITHOUT HEMATURIA, SITE UNSPECIFIED: Status: ACTIVE | Noted: 2021-01-01

## 2021-07-20 PROBLEM — Z95.810 ICD (IMPLANTABLE CARDIOVERTER-DEFIBRILLATOR), DUAL, IN SITU: Status: ACTIVE | Noted: 2021-01-01

## 2021-07-20 NOTE — ED INITIAL ASSESSMENT (HPI)
Pt to ED via EMS s/p fall 2 hours prior to arrival. Pt c/o right hip pain. Possible shortening noted to right leg. Pt hit right jaw. Pt states she got tangled in walker at restaurant. Bruising noted right knee, right jaw, and right upper leg.  Small abrasio

## 2021-07-20 NOTE — ED PROVIDER NOTES
Patient Seen in: [facility] AND [facility] Emergency Department      History   Patient presents with:  Fall    Stated Complaint: mechanical fall    HPI/Subjective:   HPI    55-year-old female SFA of hypertension, hyperlipidemia, chronic kidney disease, paroxysma laceration or abrasions. Musculoskeletal                Head: Areas of ecchymosis to the right mandible and extending superiorly to the right maxillary area. There is an abrasion noted to the right maxillary area.   No lacerations noted no scalp tendernes Narrative: The following orders were created for panel order CBC With Differential With Platelet.   Procedure                               Abnormality         Status                     ---------                               -----------         --- discussed with Dr. Barb Zamora, hospitalist.    I spent a total of 34 minutes of critical care time in obtaining history, performing a physical exam, bedside monitoring of interventions, collecting and interpreting tests and discussion with consultants but not

## 2021-07-21 NOTE — PLAN OF CARE
No acute changes during shift. Patient received one unit of blood today. Plan of care updated with patient and family at the bedside. Hemoglobin monitored. Safety measures are in place.       Problem: Patient Centered Care  Goal: Patient preferences are elvia skin integrity  - Monitor for areas of redness and/or skin breakdown  - Initiate interventions, skin care algorithm/standards of care as needed  Outcome: Progressing     Problem: HEMATOLOGIC - ADULT  Goal: Maintains hematologic stability  Description: INTE appropriate assistive device and precautions (e.g. spinal or hip dislocation precautions)  Outcome: Progressing

## 2021-07-21 NOTE — CM/SW NOTE
ROLANDO received MDO for discharge planning. SW met with patient, her son, DIL, and granddaughter at bedside to discuss discharge planning. Patient confirmed address on facesheet. Patient reports that she lives at home (5 stairs) with her son.  Patient report [facility]. Rehan Calvillo confirmed plan and reports agreement. SW notified [facility] liaison of preferred choice and possible DC on Saturday. Reserved via Aidin. Notified RN of need for covid test prior to admission to [facility].     Plan: [facility] Manuel Acute Rehab pendi

## 2021-07-21 NOTE — H&P
[facility] Hospitalist Team  History and Physical  Admit Date:  7/20/21     ASSESSMENT / PLAN:   81 yo female who lives with her son with hx OP, bladder mass, CKD stage 4, compression fx, HL, HTN, DM type II with neuropathy, s/p ICD, NICMP/chronic systolic chf wi UTI    #Atrial Fibrillation, Parosymal  -tele  -EKG with A-V pacing  -hold eliquis for now   -continue lopressor    #Chronic Systolic CHF/NICMP with Recovered EF  #CAD-mild  #S/P ICD BSC  #Carotid Disease-mild B/L  #HL  #TR-mod-Severe  -2019 thallium with number: [phone]  7/21/2021      HISTORY:   CC: Patient presents with:  Fall       PCP: Chela Rico    History of Present Illness: Hx per son and pt. Pt apparently had ICD replaced on 7/7 and appeared to help her have more energy.  In addition pt w 2010 - changed 7/7/21        ALL:    Codeine                 DIZZINESS, NAUSEA ONLY  Hydralazine             Coughing    Comment:Hx of hydralazine induced pneumonitis  Penicillins             DIARRHEA     Home Medications:  apixaban 2.5 MG Oral Tab, Take 2 WBC 10.7  --  6.9   HGB 6.4* 6.2* 7.0*  7.0*   MCV 90.5  --  89.6   .0  --  372.0       Recent Labs   Lab 07/20/21  1646 07/21/21  0605   * 138   K 3.8 3.4*   CL 93* 97*   CO2 34.0* 33.0*   * 124*   CREATSERUM 2.50* 2.18*   * 1 fracture. Soft tissue injury overlying the right lateral hip. 2. Circumferential urinary bladder wall thickening, which may be reactive to aforementioned pelvic trauma or represent cystitis.   There is also a punctate focus of gas within the urinary bladde above.   Dictated by (CST): Janice Neal MD on 7/20/2021 at 5:12 PM     Finalized by (CST): Janice Neal MD on 7/20/2021 at 5:14 PM              SEE ATTENDING NOTE BELOW      Patient examined and assessed independently.  Agree with above APN assessmen intramuscular hematoma, soft tissue swelling right lateral hip  -CT face Soft tissue injury overlying the right mandible, no fx or dislocations  -CT brain negative for acute process  -hold eliquis and ASA for now with anemia, bleeding concerns as well as p

## 2021-07-21 NOTE — ED NOTES
Orders for admission, patient is aware of plan and ready to go upstairs. Any questions, please call ED RN Bishop Mckeon  at extension [phone].    Type of COVID test sent:  Vaccinated COVID Suspicion level: Low    Titratable drug(s) infusing:  Rate:rocephin infusing

## 2021-07-21 NOTE — CONSULTS
[facility] HOSP - [facility]    Report of Consultation    Beverly Mahnazjana Patient Status:  Inpatient    1961 MRN [id number]   Location [facility] 4W/SW/SE Attending Charmayne Cranker, MD   Hosp Day # 1 PCP [address]     Date of Admission: 7/31/2021] ergocalciferol 1.25 MG (23001 UT) cap 50,000 Units, 50,000 Units, Oral, Q30 Days  •  Fluticasone Propionate (FLONASE) 50 MCG/ACT nasal spray 1 spray, 1 spray, Nasal, Daily  •  Levothyroxine Sodium tab 75 mcg, 75 mcg, Oral, Before breakfast  •  m stable    Impression and Plan:  Patient Active Problem List:     Stage 4 chronic kidney disease ([address]     PAD (peripheral artery disease) ([facility] [address]     ICD (implantable cardioverter-defibrillator), dual, in situ     Essential hypertension     Compression fract

## 2021-07-21 NOTE — OCCUPATIONAL THERAPY NOTE
From: Maria M Beyer  To: Shaina Aviles MD  Sent: 1/7/2019 2:18 PM CST  Subject: Lab Test or Test Related Question    I see the Dr next week do I need a blood test   OT orders received. Spoke with nurse. Pt currently receiving a blood transfusion. Will attempt to reschedule. Teodoro Harman

## 2021-07-21 NOTE — PROGRESS NOTES
ORTHO    Pubic rami fractures seen on radiographs and CT scan, femoral neck looks okay. This can be treated non-operatively as it is a stable fracture. Will do a full exam and consult in the morning.     Nia Frazier MD

## 2021-07-22 NOTE — OCCUPATIONAL THERAPY NOTE
OCCUPATIONAL THERAPY EVALUATION - INPATIENT      Room Number: 560/560-A  Evaluation Date: 7/22/2021  Type of Evaluation: Initial       Physician Order: IP Consult to Occupational Therapy  Reason for Therapy: ADL/IADL Dysfunction and Discharge Planning    O [facility] '6 clicks' Inpatient Daily Activity Short Form.   Research supports that patients with this level of impairment may benefit from Acute Rehab- patient is normally very independent at baseline; managing her basic self care, IADL, and mobility at Mod I t OBJECTIVE  Precautions: Limb alert - right  Fall Risk: High fall risk    WEIGHT BEARING RESTRICTION  Weight Bearing Restriction: R lower extremity        R Lower Extremity: Weight Bearing as Tolerated       PAIN ASSESSMENT  Rating:  (Not quantified-sev 8/5  Frequency:3-5x week    Félix uQiroz, OTR/L ext [phone]

## 2021-07-22 NOTE — CM/SW NOTE
Cm notified by nsg that family would like acute rehab if indicated. CM notified by PT/OT that they rec Acute.     CM notified nsg to obtain PMR consult order and rapid covid test.    CM notified Dionna Rangel liaison of need for eval, please see today if po

## 2021-07-22 NOTE — PROGRESS NOTES
[facility] Hospitalist Team  Progress Note    Odean General Patient Status:  Inpatient    7/10/1922 MRN [id number]   Location [facility] 5SW/SE Attending Clifford Knight MD   Hosp Day # 2 PCP FAN GONZALEZ     CC: Follow Up  PCP: [facility] #2     #Acute on Chronic Anemia of CKD  -baseline hgb ~9's with recent decline over last month to 7/s  -admission hgb 6.4-->1 unit-->7-->1 unit-->8.5, current hgb 8,6  -Recent hx of possible gi bleed and well as hematuria  -UA without RBC, OB stool pending Nabila Arias  -hold januvia, levemir 5 mg daily for home lantus, adjust as needed, SSI prn with meals  -accuchecks  -follows with Dr Eladio Harry     #Glaucoma  -Continue lumigan     #Rhinitis  -continue flonase     #PAD-Carotid Disease and Right Subclavian Stenosis  - 2.50* 2.18* 2.18*   CA 8.1* 7.9* 8.0*   MG  --  2.3  --    * 181* 174*       Recent Labs   Lab 07/21/21  0605   ALT 15   AST 18   ALB 2.7*       Recent Labs   Lab 07/21/21  1027 07/21/21  1503 07/21/21  1805 07/21/21  2116 07/22/21  0727   PGLU 194* (24557)    Result Date: 7/20/2021  CONCLUSION:  1. No acute intracranial process by noncontrast CT technique. 2. Nonspecific white matter changes involving both cerebral hemispheres that most likely reflect sequelae of chronic microangiopathy.  3. Saurabh Bennett lower pole exophytic cyst.  Some of the cysts are hyperdense and may relate to proteinaceous/hemorrhagic cysts, but are incompletely characterized on this non-contrast exam. 4. Scattered additional cystic pancreatic lesions including a dominant 3 x 2 cm pa for her son's memorial that she was going to host next month.  Son at bedside.      Objective  BP (!) 87/44 (BP Location: Right arm)   Pulse 69   Temp 98.1 °F (36.7 °C) (Oral)   Resp 18   Ht 4' 11\" (1.499 m)   Wt 133 lb (60.3 kg)   SpO2 98%   BMI 26.86 kg/ invasive evaluation, currently not active bleeding     #UTI  #Renal Cysts  #Hypokalemia  #Stage 4 CKD  #Atrial Fibrillation, Parosymal  #Chronic Systolic CHF/NICMP with Recovered EF  #CAD-mild  #S/P ICD BSC  #Carotid Disease-mild B/L  #HL  #TR-mod-Severe

## 2021-07-22 NOTE — PLAN OF CARE
Problem: Patient Centered Care  Goal: Patient preferences are identified and integrated in the patient's plan of care  Description: Interventions:  - What would you like us to know as we care for you?  From home with son  - Provide timely, complete, and a intact  Description: INTERVENTIONS  - Assess and document risk factors for pressure ulcer development  - Assess and document skin integrity  - Monitor for areas of redness and/or skin breakdown  - Initiate interventions, skin care algorithm/standards of ca Support and protect limb and body alignment per provider's orders  - Instruct and reinforce with patient and family use of appropriate assistive device and precautions (e.g. spinal or hip dislocation precautions)  Outcome: Progressing     Problem: PAIN - A Identify discharge learning needs (meds, wound care, etc)  - Arrange for interpreters to assist at discharge as needed  - Consider post-discharge preferences of patient/family/discharge partner  - Complete POLST form as appropriate  - Assess patient's abil

## 2021-07-22 NOTE — PHYSICAL THERAPY NOTE
PHYSICAL THERAPY EVALUATION - INPATIENT     Room Number: 560/560-A  Evaluation Date: 7/22/2021  Type of Evaluation: Initial   Physician Order: PT Eval and Treat    Presenting Problem: R superior and inferior pubic rami fx, R femoral neck fx  Reason for Th family, PT recommending acute rehab. Pt will be able to tolerate 3 hours of therapy a day. Pt will benefit from PT/OT services. Pt was independent prior to admission and is now requiring two assist for all functional mobility.  Pt has a VERY strong family s Medical History:   Diagnosis Date   • [facility])    • Bladder cancer ([facility] [address]    • Diabetes ([facility] [address]    • Essential hypertension    • Hyperlipidemia    • Hypothyroidism        Past Surgical History  Past Surgical History:   Procedure Laterality Date   • CARDIAC currently need. ..   -   Moving to and from a bed to a chair (including a wheelchair)?: A Lot   -   Need to walk in hospital room?: A Lot   -   Climbing 3-5 steps with a railing?: Total     AM-PAC Score:  Raw Score: 11   Approx Degree of Impairment: 72.57% Status    Goal #6    Goal #6  Current Status

## 2021-07-22 NOTE — PHYSICAL THERAPY NOTE
Chart reviewed pt for PT evaluation and consulted with RN who gave approval. Pt resting supine in bed reporting \"I am too tired right now to move I can not get up.  I will try later after I rest.\" Will re-attempt later if schedule permits and pt is medica

## 2021-07-23 NOTE — PROGRESS NOTES
[facility] Hospitalist Team  Progress Note    Giulia Deep Patient Status:  Inpatient    7/10/1922 MRN [id number]   Location [facility] 5SW/SE Attending Alber Mejia MD   Hosp Day # 3 PCP FAN GONZALEZ     CC: Follow Up  PCP: [facility] including a dominant 11.7 cm left lower pole exophytic cyst, ? Proteinaceous/hemorrhagic  -UA nitrite positive, large LE, WBC 6-10, 1+bacteria.  cx with klebsiella  -ceftriaxone day #3    #Hypoxemia  -pre op CXR with small left pleural effusion with possibl bp  -may need to resume low dose bumex  -prn lopressor if HR >120  -follows with Dr Frances Medeiros     #Pancreatic Lesion  -noted on previous imaging 2020-Increase in size of the cystic lesion in the pancreatic body, now measuring up to 2.9 cm.  -CT A/P with S Recent Labs   Lab 07/20/21  1646 07/20/21  2300 07/21/21  0605 07/21/21  0605 07/21/21  1246 07/21/21  1823 07/22/21  0654 07/22/21  1338 07/23/21  0641   WBC 10.7  --  6.9  --   --   --  9.2  --  8.4   HGB 6.4*   < > 7.0*  7.0*   < > 8.5* 8.9* 8.6* 8. breakfast  atorvastatin (LIPITOR) tab 10 mg, 10 mg, Oral, Nightly  glucose (DEX4) oral liquid 15 g, 15 g, Oral, Q15 Min PRN   Or  Glucose-Vitamin C (DEX-4) chewable tab 4 tablet, 4 tablet, Oral, Q15 Min PRN   Or  dextrose 50 % injection 50 mL, 50 mL, Intra pelvis/retropubic space of Retzius, which likely relates to an associated hematoma. Also noted is asymmetric enlargement of the right pelvic sidewall/ musculature, which raises suspicion for additional intramuscular hematoma formation.   There is a MD on 7/20/2021 at 7:44 PM          XR HIP W OR WO PELVIS 2 OR 3 VIEWS, RIGHT (CPT=73502)    Result Date: 7/20/2021  CONCLUSION:  1. Acute mildly displaced right superior and inferior pubic rami fractures.  2. Subtle chronic right femoral neck fracture susp intramuscular hematoma, soft tissue swelling right lateral hip  -CT face Soft tissue injury overlying the right mandible, no fx or dislocations  -CT brain negative for acute process  -hold eliquis and ASA for now with anemia, bleeding concerns as well as p

## 2021-07-23 NOTE — PLAN OF CARE
Problem: Patient Centered Care  Goal: Patient preferences are identified and integrated in the patient's plan of care  Description: Interventions:  - What would you like us to know as we care for you? From home with son.   - Provide timely, complete, and intact  Description: INTERVENTIONS  - Assess and document risk factors for pressure ulcer development  - Assess and document skin integrity  - Monitor for areas of redness and/or skin breakdown  - Initiate interventions, skin care algorithm/standards of ca Support and protect limb and body alignment per provider's orders  - Instruct and reinforce with patient and family use of appropriate assistive device and precautions (e.g. spinal or hip dislocation precautions)  Outcome: Progressing     Problem: PAIN - A Identify discharge learning needs (meds, wound care, etc)  - Arrange for interpreters to assist at discharge as needed  - Consider post-discharge preferences of patient/family/discharge partner  - Complete POLST form as appropriate  - Assess patient's abil

## 2021-07-23 NOTE — PHYSICAL THERAPY NOTE
PHYSICAL THERAPY TREATMENT NOTE - INPATIENT     Room Number: 560/560-A       Presenting Problem: R superior and inferior pubic rami fx, R femoral neck fx    Problem List  Principal Problem:    Closed fracture of pubis, unspecified portion of pubis, unspeci alarm on, RN notified of pt's functional mobility. Recommended to RN to use two assist.   The patient's Approx Degree of Impairment: 68.66% has been calculated based on documentation in the [facility] '6 clicks' Inpatient Basic Mobility Short Form.   Research brantley from a bed to a chair (including a wheelchair)?: A Lot   -   Need to walk in hospital room?: A Lot   -   Climbing 3-5 steps with a railing?: Total     AM-PAC Score:  Raw Score: 12   Approx Degree of Impairment: 68.66%   Standardized Score (AM-PAC Scale): 3

## 2021-07-23 NOTE — PLAN OF CARE
Problem: Patient Centered Care  Goal: Patient preferences are identified and integrated in the patient's plan of care  Description: Interventions:  - What would you like us to know as we care for you?  From home with son  - Provide timely, complete, and a intact  Description: INTERVENTIONS  - Assess and document risk factors for pressure ulcer development  - Assess and document skin integrity  - Monitor for areas of redness and/or skin breakdown  - Initiate interventions, skin care algorithm/standards of ca stated pain goal  Description: INTERVENTIONS:  - Encourage pt to monitor pain and request assistance  - Assess pain using appropriate pain scale  - Administer analgesics based on type and severity of pain and evaluate response  - Implement non-pharmacologi

## 2021-07-23 NOTE — CONSULTS
PHYSICAL MEDICINE AND REHABILITATION CONSULTATION     CC: Impaired mobility and ADL dysfunction secondary to fall    HPI: This is a 79 y/o female with a PMH of CKD stage 4, compression fracture, HTN, HLD, DM2, neuropathy who presented to [facility] after a fall. 75 mcg, Oral, Before breakfast  •  atorvastatin (LIPITOR) tab 10 mg, 10 mg, Oral, Nightly  •  glucose (DEX4) oral liquid 15 g, 15 g, Oral, Q15 Min PRN **OR** Glucose-Vitamin C (DEX-4) chewable tab 4 tablet, 4 tablet, Oral, Q15 Min PRN **OR** dextrose 50 % Precautions:   No orders of the defined types were placed in this encounter.        DNAR/Full Treatment    OBJECTIVE:    BP 99/51 (BP Location: Right arm)   Pulse 98   Temp 97.9 °F (36.6 °C) (Oral)   Resp 22   Ht 4' 11\" (1.499 m)   Wt 134 lb 1.6 oz (60 assess home equipment and assistive device needs. 24 hr rehab nursing also beneficial for medication management, pressure sore prevention, bowel and bladder management, skin management.  Physiatric medical oversight to monitor kidney function, cardiac fu

## 2021-07-23 NOTE — DIETARY NOTE
Brief Nutrition Note           Received consult for po intake assessment. Visited pt. Sitting on chair resting. Pt eating % of meals. Ate Breakfast and lunch well. In fact, asking for more food, so RD will add 2 snacks (PM and HS).   And soraida

## 2021-07-24 NOTE — PLAN OF CARE
No acute changes. Patient up in chair, working through pain. Hemoglobin stable. Patient anticipating discharge to [facility] today.     Problem: Patient Centered Care  Goal: Patient preferences are identified and integrated in the patient's plan of care  [facility] monitoring  - See additional Care Plan goals for specific interventions  Outcome: Adequate for Discharge     Problem: SKIN/TISSUE INTEGRITY - ADULT  Goal: Skin integrity remains intact  Description: INTERVENTIONS  - Assess and document risk factors for pre Advance activity as appropriate  - Communicate ordered activity level and limitations with patient/family  Outcome: Adequate for Discharge  Goal: Maintain proper alignment of affected body part  Description: INTERVENTIONS:  - Support and protect limb and b with appropriate resources  Description: INTERVENTIONS:  - Identify barriers to discharge w/pt and caregiver  - Include patient/family/discharge partner in discharge planning  - Arrange for needed discharge resources and transportation as appropriate  - Id

## 2021-07-24 NOTE — PLAN OF CARE
Patient cleared for discharge to [facility]. Report called to receiving RN. [facility] to provide transportation. Family at bedside and will follow patient.

## 2021-07-24 NOTE — CM/SW NOTE
Requested update from Bishop Meng regarding bed avail for today, spoke with Hina Mary [phone]. [facility]J will notify SW of bed avail for today, requested update from RN regarding final med clear. Plan: [facility].  [facility] set for 5pm Patient will go to room

## 2021-07-24 NOTE — PLAN OF CARE
Problem: Patient Centered Care  Goal: Patient preferences are identified and integrated in the patient's plan of care  Description: Interventions:  - What would you like us to know as we care for you? \"I am from home with my son. \"  - Provide timely, co remains intact  Description: INTERVENTIONS  - Assess and document risk factors for pressure ulcer development  - Assess and document skin integrity  - Monitor for areas of redness and/or skin breakdown  - Initiate interventions, skin care algorithm/standar INTERVENTIONS:  - Support and protect limb and body alignment per provider's orders  - Instruct and reinforce with patient and family use of appropriate assistive device and precautions (e.g. spinal or hip dislocation precautions)  Outcome: Progressing assist with strengthening/mobility  - Encourage toileting schedule  Outcome: Progressing     Problem: DISCHARGE PLANNING  Goal: Discharge to home or other facility with appropriate resources  Description: INTERVENTIONS:  - Identify barriers to discharge w/

## 2021-07-24 NOTE — DISCHARGE SUMMARY
General Medicine Discharge Summary     Patient ID:  Valentin Hoff  80year old  7/10/1922    Admit date: 7/20/2021    Discharge date and time: 7/24/2021    Attending Physician: Dimitri Samuels MD     Consults: IP CONSULT TO HOSPITALIST  IP CONSULT TO Kaela Bailey pt for dentures then podiatry and then was on way to eye specialist when pt stopped at restaurant for her to urinate. She got out of the car and was using her walker when she fell (per pt) or passed out (per son).  They were able to get her up and his wife -PT/OT/SW-acute rehab on discharge  -Ortho follow-up in 2 to 4 weeks     #UTI-Klebsiella  #Renal Cysts  -CT A/P with bladder wall thickening and renal cysts-left greater than right renal cysts including a dominant 11.7 cm left lower pole exophytic cyst, Lesion  -noted on previous imaging 2020-Increase in size of the cystic lesion in the pancreatic body, now measuring up to 2.9 cm.  -CT A/P with Scattered additional cystic pancreatic lesions including a dominant 3 x 2 cm pancreatic body lesion, ? pseudocyt lisinopril 5 MG Tabs     metolazone 2.5 MG Tabs  Commonly known as: ZAROXOLYN           Where to Get Your Medications      You can get these medications from any pharmacy    Bring a paper prescription for each of these medications  · traMADol HCl 50 MG T

## 2021-08-18 NOTE — ED PROVIDER NOTES
Patient Seen in: [facility] [place] AND [facility] Emergency Department      History   Patient presents with:  Urinary Symptoms    Stated Complaint:     HPI/Subjective:   HPI    80year old female with h/o atrial fibrillation on Eliquis, history of bladder cancer in rem SpO2 98%   BMI 27.27 kg/m²         Physical Exam  Vitals and nursing note reviewed. Constitutional:       General: She is not in acute distress. Appearance: She is well-developed. She is not toxic-appearing or diaphoretic.    HENT:      Head: Normoce Calcium, Total 8.1 (*)     Calculated Osmolality 314 (*)     GFR, Non- 25 (*)     GFR, -American 29 (*)     All other components within normal limits   RBC MORPHOLOGY SCAN - Abnormal; Notable for the following components:    RBC Morp represent hemorrhagic/proteinaceous cysts or solid renal mass. 3.  Multiple cystic foci within the pancreas, the 2 largest foci are 23 x 32 mm and 10 x 19 mm. They could represent pseudocysts, dilated side branches, or cystic neoplasm.   They could represe clearing of sx.   - resume Eliquis if no longer bleeding  - pt anemic but stable hgb value compared to previous recent results      Disposition and Plan     Clinical Impression:  Hemorrhagic cystitis  (primary encounter diagnosis)  Hematuria, unspecified t

## 2021-08-18 NOTE — ED INITIAL ASSESSMENT (HPI)
PRESENTS VIA EMS FOR HEMATURIA AND CLOTS IN URINE STARTING LAST NIGHT. PT REPORTS PINK URINE TODAY.  DENIES PAIN AT THIS TIME

## 2021-09-10 PROBLEM — R09.02 HYPOXIA: Status: ACTIVE | Noted: 2021-01-01

## 2021-09-10 PROBLEM — J90 PLEURAL EFFUSION: Status: ACTIVE | Noted: 2021-01-01

## 2021-09-10 NOTE — ED PROVIDER NOTES
Patient Seen in: [facility] AND [facility] Emergency Department    History   Patient presents with:  Abnormal Result    Stated Complaint: concern for pneumonia      HPI    77-year-old female with past medical history of CKD, compression fracture, dyslipidemia, hyp daily.     simvastatin 20 MG Oral Tab,  Take 20 mg by mouth nightly. SITagliptin Phosphate 25 MG Oral Tab,  Take 25 mg by mouth daily. Capsaicin 0.1 % External Cream,  Apply topically 2 (two) times daily as needed.    ergocalciferol 1.25 MG (43183 UT) O reviewed. ED Course     Labs Reviewed   CBC WITH DIFFERENTIAL WITH PLATELET    Narrative: The following orders were created for panel order CBC With Differential With Platelet.   Procedure                               Abnormality         Status left lower lobe/retrocardiac pulmonary opacity which could represent secondary compressive atelectasis related to presence of pleural effusion or pneumonia. 4.  Post left shoulder arthroplasty.    Dictated by (CST): Manuel Michel MD on 9/10/2021 at 4:55 PM encounter diagnosis)  Pleural effusion    Disposition:  Admit    Follow-up:  No follow-up provider specified.     Medications Prescribed:  Current Discharge Medication List

## 2021-09-10 NOTE — ED QUICK NOTES
Pt to ED via [address] from [facility] with c/o pneumonia. EMS states staff mentioned a concerning x-ray. Pt arrives to ED A&Ox4. Pt denies SOB, CP, cough, or recent fever. SPO2 at 89% at room room. Pt placed on 2L of O2 via NC.   Current spo

## 2021-09-11 NOTE — CONSULTS
[facility] CARDIOLOGY CONSULT      Daniela Hoff is a 80year old female who I assessed as follows:         REASON FOR CONSULTATION:    CHF            ASSESSMENT/PLAN:     IMPRESSIONS:  1. Acute on chronic systolic CHF with recovered EF.  Nonischemic cardi partial tablet 12.5 mg, 12.5 mg, Oral, BID  atorvastatin (LIPITOR) tab 10 mg, 10 mg, Oral, Nightly  glucose (DEX4) oral liquid 15 g, 15 g, Oral, Q15 Min PRN   Or  glucose-vitamin C (DEX-4) chewable tab 4 tablet, 4 tablet, Oral, Q15 Min PRN   Or  dextrose 5 (Oral)   Resp 16   Wt 145 lb 3.2 oz (65.9 kg)   SpO2 96%   BMI 29.33 kg/m²   Temp (24hrs), Av.2 °F (36.8 °C), Min:97.9 °F (36.6 °C), Max:98.5 °F (36.9 °C)    Wt Readings from Last 3 Encounters:  21 : 145 lb 3.2 oz (65.9 kg)  21 : 135 lb (61  Hg.   11. Line: A venous pacing wire is visualized in the right atrial       cavity and right ventricular cavity.            Labs:  Recent Labs   Lab 09/10/21  1623 09/11/21  0648   * 129*   BUN 51* 52*   CREATSERUM 1.62* 1.55*   GFRAA 30* 32*   GFR

## 2021-09-11 NOTE — H&P
[facility] Hospitalist H&P       CC: Patient presents with:  Abnormal Result       PCP: FAN GONZALEZ    History of Present Illness:   Patient is a 81 yo female who lives with her son with hx OP, bladder mass, CKD stage 4, compression fx, HL, HTN, DM type II w history on file. Review of Systems  Comprehensive ROS reviewed and negative except for what's stated above.      OBJECTIVE:  BP (!) 165/74   Pulse 88   Temp 98.2 °F (36.8 °C)   Resp 18   Wt 146 lb 2 oz (66.3 kg)   SpO2 (!) 89%   BMI 29.51 kg/m²   General 4:55 PM     Finalized by (CST): Jayla Anglin MD on 9/10/2021 at 4:57 PM              ASSESSMENT / PLAN:   Patient is a 79 yo female who lives with her son with hx OP, bladder mass, CKD stage 4, compression fx, HL, HTN, DM type II with neuropathy, s/p ICD, SSI     #Glaucoma  -Continue lumigan     #Rhinitis  -continue flonase     #PAD-Carotid Disease and Right Subclavian Stenosis  -continue statin    FN:  - IVF: None  - Diet: Advance    DVT Prophy: SCDs  Atrophy: I-S  Lines: PIV    Dispo: pending clinical cou

## 2021-09-11 NOTE — PLAN OF CARE
IV Bumex administered as scheduled. Teachings provided re importance of daily weight monitoring, low salt diet and fluid intake monitoring. Denies shortness of breath at this time. Pedal pulses palpable with no swelling to bilateral lower extremities.    Pr

## 2021-09-11 NOTE — PLAN OF CARE
Pt slept throughout the night. Purwick in place. Pt denies pain. Fall precautions in place, call light within reach.         Problem: Patient Centered Care  Goal: Patient preferences are identified and integrated in the patient's plan of care  Description: for changes in respiratory status  - Assess for changes in mentation and behavior  - Position to facilitate oxygenation and minimize respiratory effort  - Oxygen supplementation based on oxygen saturation or ABGs  - Provide Smoking Cessation handout, if ap Encourage toileting schedule  Outcome: Progressing     Problem: DISCHARGE PLANNING  Goal: Discharge to home or other facility with appropriate resources  Description: INTERVENTIONS:  - Identify barriers to discharge w/pt and caregiver  - Include patient/fa

## 2021-09-11 NOTE — PROGRESS NOTES
[facility] Hospitalist Progress Note     CC: Hospital Follow up    PCP: Enio Phillips       Assessment/Plan:     Principal Problem:    Hypoxia  Active Problems:    Pleural effusion    Patient is a 81 yo female who lives with her son with hx OP, bladder mass, CKD regimen: lantus 8 units daily and januvia  -resume Lantus, SSI     #Glaucoma  -Continue lumigan     #Rhinitis  -continue flonase     #PAD-Carotid Disease and Right Subclavian Stenosis  -continue statin     FN:  - IVF: None  - Diet: Advance     DVT Prophy: Labs   Lab 09/10/21  1623 09/11/21  0648   * 129*   BUN 51* 52*   CREATSERUM 1.62* 1.55*   GFRAA 30* 32*   GFRNAA 26* 27*   CA 8.5 8.5    141   K 5.1 4.3    107   CO2 27.0 31.0       Recent Labs   Lab 09/10/21  1623   ALT 32   AST 31   A

## 2021-09-11 NOTE — PHYSICAL THERAPY NOTE
PHYSICAL THERAPY EVALUATION - INPATIENT     Room Number: 346/346-A  Evaluation Date: 9/11/2021  Type of Evaluation: Initial   Physician Order: PT Eval and Treat    Presenting Problem: hypoxia  Reason for Therapy: Mobility Dysfunction and Discharge Teon CARDIAC DEFIBRILLATOR PLACEMENT  07/07/2021    originally placed 2010 - changed 7/7/21   • CARDIAC PACEMAKER PLACEMENT  07/07/2021    originally placed 2010 - changed 7/7/21       HOME SITUATION  Type of Home: House      Stairs to Enter : 5  Railing:  Yes room?: A Little   -   Climbing 3-5 steps with a railing?: A Little     AM-PAC Score:  Raw Score: 19   Approx Degree of Impairment: 41.77%   Standardized Score (AM-PAC Scale): 45.44   CMS Modifier (G-Code): CK    FUNCTIONAL ABILITY STATUS  Gait Assessment

## 2021-09-12 NOTE — PHYSICAL THERAPY NOTE
PHYSICAL THERAPY TREATMENT NOTE - INPATIENT     Room Number: 346/346-A       Presenting Problem: hypoxia    Problem List  Principal Problem:    Hypoxia  Active Problems:    Pleural effusion      PHYSICAL THERAPY ASSESSMENT     PPE donned this session to in education; Gait training; Stair training; Transfer training    SUBJECTIVE  \"Help me lean forward in the chair. \"    OBJECTIVE       WEIGHT BEARING RESTRICTION                   PAIN ASSESSMENT   Ratin          BALANCE addressed; Alarm set    CURRENT GOALS    PHYSICAL THERAPY EVALUATION - INPATIENT     Room Number: 346/346-A  Evaluation Date: 9/11/2021  Type of Evaluation: Initial   Physician Order: PT Eval and Treat    Presenting Problem: hypoxia  Reason for Therapy:  Katheran Schirmer Laterality Date   • CARDIAC DEFIBRILLATOR PLACEMENT  07/07/2021    originally placed 2010 - changed 7/7/21   • CARDIAC PACEMAKER PLACEMENT  07/07/2021    originally placed 2010 - changed 7/7/21       HOME SITUATION  Type of Home: House      Stairs to Synthace railing?: A Lot     AM-PAC Score:  Raw Score: 17   Approx Degree of Impairment: 50.57%   Standardized Score (AM-PAC Scale): 42.13   CMS Modifier (G-Code): CK    FUNCTIONAL ABILITY STATUS  Gait Assessment   Gait Assistance: Contact guard assist  Distance (f

## 2021-09-12 NOTE — PROGRESS NOTES
[facility] Hospitalist Progress Note     CC: Hospital Follow up    PCP: Severa Loveless       Assessment/Plan:     Principal Problem:    Hypoxia  Active Problems:    Pleural effusion    Patient is a 79 yo female who lives with her son with hx OP, bladder mass, CKD pancreatic body lesion, ? pseudocyts or cystic pancreatic lesion  -FU PCP as outpatient     #Hypothyroidism  -continue synthroid      #DM Type II with Neuropathy  -HgbA1C 8.2  -home regimen: lantus 8 units daily and januvia  -resume Lantus, SSI     #Glauco Recent Labs   Lab 09/10/21  1623 09/11/21  0648   RBC 3.22* 3.10*   HGB 9.4* 9.2*   HCT 32.5* 31.6*   .9* 101.9*   MCH 29.2 29.7   MCHC 28.9* 29.1*   RDW 21.1* 20.9*   NEPRELIM 4.64 5.38   WBC 6.8 7.2   .0 258.0         Recent Labs   Lab

## 2021-09-12 NOTE — PROGRESS NOTES
[facility]    Assessment and Plan:     IMPRESSIONS:  1. Acute on chronic systolic CHF with recovered EF. Nonischemic cardiomyopathy  2. S/p ICD  3. CKD  4. HTN, controlled  5. HL, on statin  6. DM  7. CAD, mild  8.  Right subclavian artery moderate-sized right pleural effusion which has increased in size since 7/20/21. Interval development of a small right basal pleural effusion. 2.  Cardiomegaly. Pulmonary vascular congestion. Post cardiac pacing device/ICD placement.  3.  Nonspecific lef reduced. 8.  Tricuspid valve: There is moderate-severe, 3-4+ regurgitation. 9.  Pericardium, extracardiac: There is a left pleural effusion. 10. Pulmonary arteries: Systolic pressure is estimated to be 44 mm       Hg.    11. Line: A venous pacing wire

## 2021-09-12 NOTE — PLAN OF CARE
Pt resting comfortably throughout the night. No pain complains. SCDs on. Purewick in place. Fall precautions in place, call light within reach.      Problem: Patient Centered Care  Goal: Patient preferences are identified and integrated in the patient's kelsey INTERVENTIONS:  - Assess for changes in respiratory status  - Assess for changes in mentation and behavior  - Position to facilitate oxygenation and minimize respiratory effort  - Oxygen supplementation based on oxygen saturation or ABGs  - Provide Smoking strengthening/mobility  - Encourage toileting schedule  Outcome: Progressing     Problem: DISCHARGE PLANNING  Goal: Discharge to home or other facility with appropriate resources  Description: INTERVENTIONS:  - Identify barriers to discharge w/pt and careg administer replacement therapy as ordered  Outcome: Progressing

## 2021-09-12 NOTE — CM/SW NOTE
CM NOTED DC PLANNING  Patient would like to discharge back to [facility], private room, upon discharge from hospital. [address] ready on Monday or Tuesday. Pt is from [facility].     Initiated SNF referral w/reservation in aidid w/comments per

## 2021-09-13 NOTE — PROGRESS NOTES
[facility]    Assessment and Plan:     IMPRESSIONS:  1. Acute on chronic systolic CHF with recovered EF. Nonischemic cardiomyopathy  2. S/p ICD  3. CKD  4. HTN, controlled  5. HL, on statin  6. DM  7. CAD, mild  8.  Right subclavian artery thickness is       normal. Systolic function is at the lower limits of normal. The       estimated ejection fraction is 50%.  Although no diagnostic       regional wall motion abnormality is identified, this       possibility cannot be completely excluded o

## 2021-09-13 NOTE — OCCUPATIONAL THERAPY NOTE
OCCUPATIONAL THERAPY EVALUATION - INPATIENT     Room Number: 346/346-A  Evaluation Date: 9/13/2021  Type of Evaluation: Initial       Physician Order: IP Consult to Occupational Therapy  Reason for Therapy: ADL/IADL Dysfunction and Discharge Planning    OC SATURATIONS  Oxygen Therapy  SPO2% on Room Air at Rest: 84  SPO2% on Oxygen at Rest: 92  At rest oxygen flow (liters per minute): 2  SPO2% Ambulation on Oxygen: 88  Ambulation oxygen flow (liters per minute): 2       DISCHARGE RECOMMENDATIONS  OT Discharge clothing?: A Lot  -   Bathing (including washing, rinsing, drying)?: A Little  -   Toileting, which includes using toilet, bedpan or urinal? : A Little  -   Putting on and taking off regular upper body clothing?: A Little  -   Taking care of personal groom

## 2021-09-13 NOTE — CM/SW NOTE
BPCI-[employer] Medicare Program Note:  Plan of care reviewed for care coordination and discharge planning. Noted patient falls under  BPCI/Medicare program, with  for CHF. MICHELLE tool was used to help determine next care setting.  Thus, [address] recommend

## 2021-09-13 NOTE — CM/SW NOTE
Received MDO for DC planning and request to return [facility] at time of DC. Pt also requesting a private room. Per chart review, DHAVAL Newton finalized plan over the weekend. [facility] reserved via Aidin.     SW contacted liaison Sergio pabon/ [facility] an

## 2021-09-13 NOTE — PROGRESS NOTES
[facility] Hospitalist Progress Note     CC: Hospital Follow up    PCP: Fernando Cheema       Assessment/Plan:     Principal Problem:    Hypoxia  Active Problems:    Pleural effusion    Patient is a 79 yo female who lives with her son with hx OP, bladder mass, CKD pancreatic lesions including a dominant 3 x 2 cm pancreatic body lesion, ? pseudocyts or cystic pancreatic lesion  -FU PCP as outpatient     #Hypothyroidism  -continue synthroid      #DM Type II with Neuropathy  -HgbA1C 8.2  -home regimen: lantus 8 units d extremity  Skin: no rashes or lesions  Neuro: AO*3, motor intact, no sensory deficits  Psyc: appropriate mood and affect      Data Review:       Labs:     Recent Labs   Lab 09/10/21  1623 09/11/21  0648   RBC 3.22* 3.10*   HGB 9.4* 9.2*   HCT 32.5* 31.6* glucose-vitamin C, acetaminophen, melatonin, PEG 3350, bisacodyl, ondansetron

## 2021-09-13 NOTE — PLAN OF CARE
Pt is alert and oriented. On 0.5 L oxygen. On iv bumex. Cardiac diet and fluid restriction. Call light within reach.    Problem: Patient Centered Care  Goal: Patient preferences are identified and integrated in the patient's plan of care  Description: Inter changes in respiratory status  - Assess for changes in mentation and behavior  - Position to facilitate oxygenation and minimize respiratory effort  - Oxygen supplementation based on oxygen saturation or ABGs  - Provide Smoking Cessation handout, if applic Encourage toileting schedule  Outcome: Progressing     Problem: DISCHARGE PLANNING  Goal: Discharge to home or other facility with appropriate resources  Description: INTERVENTIONS:  - Identify barriers to discharge w/pt and caregiver  - Include patient/fa therapy as ordered  Outcome: Progressing

## 2021-09-13 NOTE — PLAN OF CARE
Problem: Patient Centered Care  Goal: Patient preferences are identified and integrated in the patient's plan of care  Description: Interventions:  - What would you like us to know as we care for you?  I have a lot of nurses in my family- They tell me gre and minimize respiratory effort  - Oxygen supplementation based on oxygen saturation or ABGs  - Provide Smoking Cessation handout, if applicable  - Encourage broncho-pulmonary hygiene including cough, deep breathe, Incentive Spirometry  - Assess the need f facility with appropriate resources  Description: INTERVENTIONS:  - Identify barriers to discharge w/pt and caregiver  - Include patient/family/discharge partner in discharge planning  - Arrange for needed discharge resources and transportation as appropri with PT. No complaints of pain. Pending discharge patient will return to [facility].

## 2021-09-14 NOTE — CM/SW NOTE
CM spoke with Erendira Reynolds at [facility]. Pt was admitted to [facility] on 8/10. Pt has used 35 of her medicare days. Per Dr. Dempsey Ear is to monitor HGB, promote BM/ monitor for Occult blood and return to [facility] 9/15.     Updates sent via Aidin to GEOVANY

## 2021-09-14 NOTE — PROGRESS NOTES
[facility] Hospitalist Progress Note     CC: Hospital Follow up    PCP: Ana Cristina Prince       Assessment/Plan:     Principal Problem:    Hypoxia  Active Problems:    Pleural effusion    Patient is a 81 yo female who lives with her son with hx OP, bladder mass, CKD with Scattered additional cystic pancreatic lesions including a dominant 3 x 2 cm pancreatic body lesion, ? pseudocyts or cystic pancreatic lesion  -FU PCP as outpatient     #Hypothyroidism  -continue synthroid      #DM Type II with Neuropathy  -HgbA1C 8.2 Gen: No acute distress, alert and oriented x3,   Heent: NC AT,   Pulm: Lungs clear,   CV: Heart with regular rate and rhythm, murmur noted  Abd: Abdomen soft, nontender, nondistended,   MSK: Trace edema lower extremity  Skin: no rashes or lesions  Neuro:

## 2021-09-14 NOTE — PHYSICAL THERAPY NOTE
PHYSICAL THERAPY TREATMENT NOTE - INPATIENT     Room Number: 346/346-A       Presenting Problem: hypoxia    Problem List  Principal Problem:    Hypoxia  Active Problems:    Pleural effusion      PHYSICAL THERAPY ASSESSMENT     PT wore 2 droplet masks, gogg BASIC MOBILITY  How much difficulty does the patient currently have. ..  -   Turning over in bed (including adjusting bedclothes, sheets and blankets)?: A Little   -   Sitting down on and standing up from a chair with arms (e.g., wheelchair, bedside commode discharge.    Goal #5   Current Status IN PROGRESS

## 2021-09-14 NOTE — PROGRESS NOTES
[facility]    Assessment and Plan:     IMPRESSIONS:  1. NICM - Acute on chronic HFrEF with recovered EF  2. S/p ICD  3. CKD  4. HTN, controlled  5. HL, on statin  6. DM  7. CAD, mild  8. Right subclavian artery stenosis  9.  PAF, currently ventricle: The cavity size is normal. Wall thickness is       normal. Systolic function is at the lower limits of normal. The       estimated ejection fraction is 50%.  Although no diagnostic       regional wall motion abnormality is identified, this

## 2021-09-14 NOTE — PLAN OF CARE
Pt is alert and oriented. On 1 L oxygen, goal to is to wean to RA. On PO bumex. Cardiac diet and fluid restriction. Purewick in place. Call light within reach.    Problem: Patient Centered Care  Goal: Patient preferences are identified and integrated in the oxygenation  Description: INTERVENTIONS:  - Assess for changes in respiratory status  - Assess for changes in mentation and behavior  - Position to facilitate oxygenation and minimize respiratory effort  - Oxygen supplementation based on oxygen saturation OT/PT consult to assist with strengthening/mobility  - Encourage toileting schedule  Outcome: Progressing     Problem: DISCHARGE PLANNING  Goal: Discharge to home or other facility with appropriate resources  Description: INTERVENTIONS:  - Identify barrier arrhythmias  - Monitor electrolytes and administer replacement therapy as ordered  Outcome: Progressing

## 2021-09-14 NOTE — PLAN OF CARE
Problem: Patient Centered Care  Goal: Patient preferences are identified and integrated in the patient's plan of care  Description: Interventions:  - What would you like us to know as we care for you?  I have a lot of nurses in my family- They tell me gre and minimize respiratory effort  - Oxygen supplementation based on oxygen saturation or ABGs  - Provide Smoking Cessation handout, if applicable  - Encourage broncho-pulmonary hygiene including cough, deep breathe, Incentive Spirometry  - Assess the need f facility with appropriate resources  Description: INTERVENTIONS:  - Identify barriers to discharge w/pt and caregiver  - Include patient/family/discharge partner in discharge planning  - Arrange for needed discharge resources and transportation as appropri Pending medical clearance. Patient will be discharged to [facility] tomorrow. Red streaks found on hanane WRIGHT paged. Will continue continue to monitor.

## 2021-09-15 NOTE — DISCHARGE SUMMARY
General Medicine Discharge Summary     Patient ID:  Sharyle Najjar Bonsonto  80year old  7/10/1922    Admit date: 9/10/2021    Discharge date and time: No discharge date for patient encounter.  9/15/21    Attending Physician: Nikole Sanches MD     Consults: IP CO distention.     Hospital Course:   Patient is a 79 yo female who lives with her son with hx OP, bladder mass, CKD stage 4, compression fx, HL, HTN, DM type II with neuropathy, s/p ICD, NICMP/chronic systolic chf with normalized EF, CAD-mild, carotid disease as outpatient     #Hypothyroidism  -continue synthroid      #DM Type II with Neuropathy  -HgbA1C 8.2  -home regimen: lantus 8 units daily and januvia  -resume Lantus, SSI     #Glaucoma  -Continue lumigan     #Rhinitis  -continue flonase     #PAD-Carotid [facility] [place] [facility] Medications      These medications were sent to [facility] [facility], [address] [place] [address]    Phone: [phone]   · Lantus SoloStar 100 UNIT/ML Sopn         FU   Follow-up Information

## 2021-09-15 NOTE — PLAN OF CARE
PT worked w/ PT/OT. Urine sample sent down for UA-- pt to follow up w/ PCP on results. Oleg Hargrove called and updated on POC and discharge time for pt. Plan for dischage today back to [facility] at [phone] via ambulance.     Problem: Patient Centered Care ADULT  Goal: Achieves optimal ventilation and oxygenation  Description: INTERVENTIONS:  - Assess for changes in respiratory status  - Assess for changes in mentation and behavior  - Position to facilitate oxygenation and minimize respiratory effort  - Oxyg assistive devices as appropriate  - Consider OT/PT consult to assist with strengthening/mobility  - Encourage toileting schedule  Outcome: Completed     Problem: DISCHARGE PLANNING  Goal: Discharge to home or other facility with appropriate resources  Desc measures for life threatening arrhythmias  - Monitor electrolytes and administer replacement therapy as ordered  Outcome: Completed

## 2021-09-15 NOTE — PHYSICAL THERAPY NOTE
PHYSICAL THERAPY TREATMENT NOTE - INPATIENT     Room Number: 346/346-A       Presenting Problem: hypoxia    Problem List  Principal Problem:    Hypoxia  Active Problems:    Pleural effusion      PHYSICAL THERAPY ASSESSMENT     PT wore 2 droplet masks, gogg over in bed (including adjusting bedclothes, sheets and blankets)?: A Little   -   Sitting down on and standing up from a chair with arms (e.g., wheelchair, bedside commode, etc.): A Little   -   Moving from lying on back to sitting on the side of the bed?

## 2021-09-15 NOTE — PROGRESS NOTES
[facility] - [facility]    Assessment and Plan:     IMPRESSIONS:  1. NICM - Acute on chronic HFrEF with recovered EF  2. S/p ICD  3. CKD  4. HTN, controlled  5. HL, on statin  6. DM  7. CAD, mild  8. Right subclavian artery stenosis  9.  PAF, currently ventricle: The cavity size is normal. Wall thickness is       normal. Systolic function is at the lower limits of normal. The       estimated ejection fraction is 50%.  Although no diagnostic       regional wall motion abnormality is identified, this

## 2021-09-15 NOTE — CM/SW NOTE
09/15/21 1400   Discharge disposition   Expected discharge disposition [facility]   Discharge transportation [facility]   MDO received for discharge, patient is cleared for Sub Acute Rehabilitation at [facility]

## 2021-09-15 NOTE — PLAN OF CARE
P.o bumex continues. Diuresing well. Purewick in place. Blood tinged urine noted last night- MD aware. Needs to collect urine sample. Plan: Return to [facility].     Problem: Patient Centered Care  Goal: Patient preferences are identified and integrated in oxygenation  Description: INTERVENTIONS:  - Assess for changes in respiratory status  - Assess for changes in mentation and behavior  - Position to facilitate oxygenation and minimize respiratory effort  - Oxygen supplementation based on oxygen saturation OT/PT consult to assist with strengthening/mobility  - Encourage toileting schedule  Outcome: Progressing     Problem: DISCHARGE PLANNING  Goal: Discharge to home or other facility with appropriate resources  Description: INTERVENTIONS:  - Identify barrier arrhythmias  - Monitor electrolytes and administer replacement therapy as ordered  Outcome: Progressing

## 2021-11-03 PROBLEM — N17.9 ACUTE RENAL FAILURE (ARF) (HCC): Status: ACTIVE | Noted: 2021-01-01

## 2021-11-03 PROBLEM — R79.89 AZOTEMIA: Status: ACTIVE | Noted: 2021-01-01

## 2021-11-03 PROBLEM — I50.9 ACUTE ON CHRONIC CONGESTIVE HEART FAILURE, UNSPECIFIED HEART FAILURE TYPE (HCC): Status: ACTIVE | Noted: 2021-01-01

## 2021-11-03 PROBLEM — D64.9 ANEMIA: Status: ACTIVE | Noted: 2021-01-01

## 2021-11-03 PROBLEM — N28.9 ACUTE ON CHRONIC RENAL INSUFFICIENCY: Status: ACTIVE | Noted: 2021-01-01

## 2021-11-03 PROBLEM — N18.9 ACUTE ON CHRONIC RENAL INSUFFICIENCY: Status: ACTIVE | Noted: 2021-01-01

## 2021-11-03 PROBLEM — J96.21 ACUTE ON CHRONIC RESPIRATORY FAILURE WITH HYPOXIA (HCC): Status: ACTIVE | Noted: 2021-01-01

## 2021-11-03 PROBLEM — S70.01XA CONTUSION OF RIGHT HIP, INITIAL ENCOUNTER: Status: ACTIVE | Noted: 2021-01-01

## 2021-11-03 NOTE — CONSULTS
[facility]    PATIENT'S NAME: Atif Rodolfo LOPEZ   ATTENDING PHYSICIAN: Faye Queen MD   CONSULTING PHYSICIAN: Nafisa Jones.  Jyothi Richards MD   PATIENT ACCOUNT#:   [id number]    LOCATION:  [address] [place] [address] #:   [id number]       DATE OF BIRTH: Blood pressure 136/67; respirations 16, breathing unlabored; pulse 82, regular rhythm; afebrile. NECK:  Neck veins are moderately distended. Carotids are normal without bruits. No thyromegaly.   LUNGS:  Scattered wheezing with diminished breath sounds in

## 2021-11-03 NOTE — ED QUICK NOTES
Orders for admission, patient is aware of plan and ready to go upstairs. Any questions, please call ED RN Cathy  at [address].    Type of COVID test sent:  COVID Suspicion level: Low    Titratable drug(s) infusing:  Rate:    LOC at time of transport: a

## 2021-11-03 NOTE — CONSULTS
Cardiology (consult dictated)    Assessment:  1. Mechanical fall    2. Congestive heart failure    3. 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 4.  Dual-chamber pacemaker.     5.  History of paroxysmal atrial fibrillati

## 2021-11-03 NOTE — CONSULTS
[facility] HOSP - [facility]    Report of Consultation    Crystal Mcbride Patient Status:  Emergency    7/10/1922 MRN [id number]   Location [address] Attending Saniya Hahn MD   Hosp Day # 0 [facility] [address] [facility]     Date of Ad ONLY  Hydralazine             Coughing    Comment:Hx of hydralazine induced pneumonitis  Penicillins             DIARRHEA    Review of Systems:   Constitutional: denies fevers, chills, weakness, fatigue, recent illness  HEENT: denies headache, sore throat, Jaci Patrick MD on 11/03/2021 at 12:17 PM          CT BRAIN OR HEAD (63857)    Result Date: 11/3/2021  CONCLUSION:   No acute intracranial abnormality. Generalized atrophy with chronic microvascular white matter ischemia similar to the prior study.     Dictated and hold off thoracentesis. Effusion does not appear to be significantly changed from September 2021.  -Empiric antibiotic therapy at this time with azithromycin.  -Nebulizer therapy  -Monitor response to diuretic therapy.   -2D echo results pending.  -Fur

## 2021-11-03 NOTE — PLAN OF CARE
Daniela AOX4, RA, IV lasix given, pt reporting nausea and given PRN zofran, reporting generalized weakness, purewick in place      Problem: Patient Centered Care  Goal: Patient preferences are identified and integrated in the patient's plan of care  [address] [facility] bleeding, hypotension and signs of decreased cardiac output  - Evaluate effectiveness of vasoactive medications to optimize hemodynamic stability  - Monitor arterial and/or venous puncture sites for bleeding and/or hematoma  - Assess quality of pulses, ski

## 2021-11-03 NOTE — ED INITIAL ASSESSMENT (HPI)
Pt reports she slid out of her bed causing her to fall onto her buttocks. This happened yesterday. Pt does not take blood thinners. Pt states she thinks she bumped the back of her head on her bed during the fall.  Pt also reports right and left knee pain an

## 2021-11-03 NOTE — ED PROVIDER NOTES
Patient Seen in: [facility] AND [facility] Emergency Department      History   Patient presents with:  Knee Pain  Fall    Stated Complaint: fall    Subjective:       80year old female with past medical history of atrial fibrillation on Eliquis with AICD in plac Pulse 80   Resp 20   Temp 98.9 °F (37.2 °C)   Temp src Temporal   SpO2 97 %   O2 Device Nasal cannula       Current:/71 (BP Location: Right arm)   Pulse 80   Temp 98.2 °F (36.8 °C) (Oral)   Resp 20   SpO2 93%         Physical Exam  Vitals and nursi Behavior normal. Behavior is cooperative.            ED Course     Labs Reviewed   BASIC METABOLIC PANEL (8) - Abnormal; Notable for the following components:       Result Value    Glucose 208 (*)     CO2 35.0 (*)     BUN 87 (*)     Creatinine 2.39 (*) or dislocation.     Dictated by (CST): Arin Franco MD on 11/03/2021 at 12:16 PM     Finalized by (CST): Arin Franco MD on 11/03/2021 at 12:17 PM          CT BRAIN OR HEAD (35310)    Result Date: 11/3/2021  CONCLUSION:   No acute intracranial a no administration in time range)                This is a patient with progressively worsening cardiorenal syndrome, CHF.   Today her oxygen was not on when she was placed in the bed and she desatted to 67% rather rapidly, improved to O2 sat low 90s with on effusion J90 9/10/2021 Unknown

## 2021-11-04 NOTE — DISCHARGE SUMMARY
[facility]    Discharge Summary    Isabella Hoff Patient Status:  Inpatient    7/10/1922 MRN [id number]   Location [facility] 3W/SW Attending Esvin Whittaker MD   Hosp Day # 0 SHARON Abdalla     Date of Admission: 11/3/20 Medicine              Discharge Plan:   Discharge Condition:     Current Discharge Medication List    Home Meds - Unchanged    insulin glargine (LANTUS SOLOSTAR) 100 UNIT/ML Subcutaneous Solution Pen-injector  Inject 8 Units into the skin daily.

## 2021-11-04 NOTE — PROGRESS NOTES
11/03/21 1900   Clinical Encounter Type   Visited With Health care provider   Crisis Visit Critical care   Referral From Nurse   Referral To       responded to code blue. Observed med staff. No family present.  Per chart: Acute on chronic

## 2021-11-04 NOTE — SIGNIFICANT EVENT
Received phone call from tele to check on Daniela, upon arrival to room patient was unresponsive and found to have no pulse. Time of death pronounced by Dr. Can Gamez at 5020. Son Paul Ruiz notified. Dr. Pari Larson notified.

## 2021-11-04 NOTE — H&P
[facility] HOSP - [facility]    History and Physical    [facility] Patient Status:  Inpatient    7/10/1922 MRN [id number]   Location [facility] 3W/SW Attending Robyn Gleason MD   Hosp Day # 0 SHARON Cruz     Date:  11/3/2021  Date mg total) by mouth every 8 (eight) hours. Bimatoprost 0.01 % Ophthalmic Solution, Inject 1 drop into the eye nightly.   Levothyroxine Sodium 75 MCG Oral Tab, Take 75 mcg by mouth before breakfast. Not Sunday   metoprolol tartrate 25 MG Oral Tab, Take 12.5 11/03/2021     11/03/2021    K 3.7 11/03/2021     11/03/2021    CO2 35.0 (H) 11/03/2021     (H) 11/03/2021    CA 8.7 11/03/2021    ALB 3.1 (L) 09/10/2021    ALKPHO 302 (H) 09/10/2021    BILT 0.3 09/10/2021    TP 7.8 09/10/2021    AST 31 further analysis INSUFFICIENT DATA When compared with ECG of 09/10/2021 16:31:51 No change Electronically signed on 11/03/2021 at 17:19 by Boyd Willard MD      Assessment/Plan:     Acute on chronic respiratory failure with hypoxia ([address]  On O2 pulmonar

## 2021-11-04 NOTE — SIGNIFICANT EVENT
ICU nocturnist.    Initially RATNA ZULMA called to room 333. On arrival however discovered patient is a DNR. Patient assessed, pupils nonreactive, reflexes absent. No heart sounds or breath sounds. Patient declared dead at 1906 on 11/3/2021.   No fami

## 2021-11-08 NOTE — CDS QUERY
How to Answer this Query    1.) Click \"Edit Button\" on the toolbar  2.) Type an \"X\" in the bracket for the diagnosis that applies. (You may also add additional clinical details as you feel necessary to substantiate your response).   3.) Finally click \" Thank You!      THIS FORM IS A PERMANENT PART OF THE MEDICAL RECORD

## 2023-05-25 NOTE — PLAN OF CARE
Problem: Patient Centered Care  Goal: Patient preferences are identified and integrated in the patient's plan of care  Description: Interventions:  - What would you like us to know as we care for you?  From home with son  - Provide timely, complete, and a intact  Description: INTERVENTIONS  - Assess and document risk factors for pressure ulcer development  - Assess and document skin integrity  - Monitor for areas of redness and/or skin breakdown  - Initiate interventions, skin care algorithm/standards of ca Vascular Surgery Daily Progress Note    SUBJECTIVE:  Pt seen and examined, and is resting comfortably in bed. No acute events overnight. Pain is adequately controlled on current regimen. Pt has no complaints at this time.     OBJECTIVE:  Vital Signs Last 24 Hrs  T(C): 36.4 (25 May 2023 07:02), Max: 36.6 (24 May 2023 09:09)  T(F): 97.5 (25 May 2023 07:02), Max: 97.9 (24 May 2023 09:09)  HR: 64 (25 May 2023 07:02) (56 - 76)  BP: 119/66 (25 May 2023 07:02) (92/57 - 119/66)  BP(mean): --  RR: 17 (25 May 2023 07:02) (17 - 18)  SpO2: 98% (25 May 2023 07:02) (96% - 99%)    Parameters below as of 25 May 2023 05:10  Patient On (Oxygen Delivery Method): room air        I&O's Detail    24 May 2023 07:01  -  25 May 2023 07:00  --------------------------------------------------------  IN:    Oral Fluid: 720 mL  Total IN: 720 mL    OUT:    Voided (mL): 2350 mL  Total OUT: 2350 mL    Total NET: -1630 mL        Exam:  General: NAD, Lying in bed comfortably  Neuro: A+Ox3  Resp: Good effort, nonlabored breathing  GI/Abd: Soft, NT/ND, no rebound/guarding, palpable pulsatile mass in mid abd.   Vascular: All 4 extremities warm. b/l palpable fem/pop/dp, no wounds                        11.1   5.03  )-----------( 164      ( 25 May 2023 01:52 )             34.8       05-25    139  |  106  |  14  ----------------------------<  102<H>  4.1   |  23  |  0.88    Ca    8.7      25 May 2023 01:52  Phos  3.2     05-25  Mg     1.9     05-25        PT/INR - ( 25 May 2023 01:52 )   PT: 13.5 sec;   INR: 1.17 ratio         PTT - ( 25 May 2023 01:52 )  PTT:50.4 sec            ASSESSMENT:  83-year-old gentleman PMH HTN, HLD, CM, CAD ( S/P 3 vessel CABG 2002), PCI to RCA 4/11/16, life vest x 3 months, former smoker ( 80 pack years),  AICD presented to the Dr. Tellez's office on 5/17/2032 with a report of a large abdominal aortic aneurysm. The patient's granddaughter stated that several weeks ago the patient was lying down and he felt a pulsatile mass in his abdomen. Patient went a duplex study which showed an 8.5 cm abdominal aortic aneurysm. Patient does not have complaints of abdominal or back pain. CTA outpatient on 5/19/2023 showed  infrarenal abdominal aortic aneurysm measuring 7.1 cm in diameter containing a large amount of mural thrombus. Patient presented to ER today for preop monitor and optimization.     PLAN:  - OR Today for EVAR 5/25  - ASA, SQH  - Cardiology consult appreciated- will need AICD interrogation prior to OR  - TTE: Severe left ventricular systolic dysfunction with estimated LV EF ~20-25%  - Medicine consult appreciated  - Pulm recommendations appreciated, chest CT pending       Vascular Surgery  [id number]       stated pain goal  Description: INTERVENTIONS:  - Encourage pt to monitor pain and request assistance  - Assess pain using appropriate pain scale  - Administer analgesics based on type and severity of pain and evaluate response  - Implement non-pharmacologi

## (undated) NOTE — IP AVS SNAPSHOT
Patient Demographics     Address  [address] [place] [address] Phone  [phone] [facility]  [phone] [facility] E-mail Address  [email or web address]      Emergency Contact(s)     Name Relation Home Work Mobile    Demian Dejesus [phone] as: LUMIGAN  Next dose due: Tonight       Inject 1 drop into the eye nightly. bumetanide 2 MG Tabs  Commonly known as: BUMEX  Next dose due: Tomorrow morning      Take 3 mg by mouth daily.    Soco Strong MD         Capsaicin 0.1 % Crea      Apply top 100 UNIT/ML [facility]           [id number] - MAR ACTION REPORT  (last 24 hrs)    ** SITE UNKNOWN **     Order ID Medication Name Action Time Action Reason Comments    [id number] aspirin EC tab 81 mg 09/15/21 0931 Given      [id number] atorvastatin (LIPITOR) tab 10 (Critical access hospital)   Clarity Urine Hazy Clear [facility] (Critical access hospital)   Spec Gravity 1.011 1.001 - 1.030 — [facility] (Critical access hospital)   Glucose Urine 50  Negative mg/dL [facility] (Critical access hospital)   Bilirubin Urine Negative Negative — [facility] (Critical access hospital)   Ketones Urine Negative Negative [[id number]] (Abnormal)  Resulted: 09/15/21 0610, Result status: Final result   Ordering provider: Ruba Boyer MD  09/14/21 2300 Resulting lab: [facility] ([facility])   Narrative:   The following orders were created for panel or result    Blood Culture Preliminary result         H&P - H&P Note      H&P signed by Germaine Barraza MD at 9/10/2021  7:22 PM  Version 1 of 1    Author: Germaine Barraza MD Service: — Author Type: Physician    Filed: 9/10/2021  7:22 PM Date of Service: 9/10/2021  7:1 pneumonitis  Penicillins             DIARRHEA     Home Medications:  No outpatient medications have been marked as taking for the 9/10/21 encounter [facility] Encounter).         Soc Hx  Social History    Tobacco Use      Smoking status: Never Smoker      Sm Pulmonary vascular congestion. Post cardiac pacing device/ICD placement. 3.  Nonspecific left lower lobe/retrocardiac pulmonary opacity which could represent secondary compressive atelectasis related to presence of pleural effusion or pneumonia.  4.  Post with Scattered additional cystic pancreatic lesions including a dominant 3 x 2 cm pancreatic body lesion, ? pseudocyts or cystic pancreatic lesion  -FU PCP as outpatient     #Hypothyroidism  -continue synthroid      #DM Type II with Neuropathy  -HgbA1C 8.2 subclavian artery stenosis  9. PAF, currently atrial paced  10. Right subclavian artery stenosis        PLAN:  1. IV diurese and follow renal function  2. eliquis was held for hematuria in past; ?resume  3.  No ACE-I/ARB due to renal issues      Cardiologis Oral, Q15 Min PRN   Or  glucose-vitamin C (DEX-4) chewable tab 8 tablet, 8 tablet, Oral, Q15 Min PRN  bumetanide (BUMEX) 0.25 MG/ML injection 2 mg, 2 mg, Intravenous, BID (Diuretic)  Heparin Sodium (Porcine) 5000 UNIT/ML injection 5,000 Units, 5,000 Units, no apparent distress  SKIN: no rashes  HEENT: atraumatic, normocephalic  NECK: supple, no bruits  LUNGS: decreased breath sounds right base  CARDIO: RRR with 2/6 systolic murmur  GI: soft, nontender  EXTREMITIES: trace edema  NEUROLOGY: alert  PSYCH: coope input(s): TROP in the last 168 hours.   Recent Labs   Lab 09/11/21  0648   RBC 3.10*   HGB 9.2*   HCT 31.6*   .9*   MCH 29.7   MCHC 29.1*   RDW 20.9*   NEPRELIM 5.38   WBC 7.2   .0         Imaging:  XR CHEST AP PORTABLE  (CPT=71045)    Result appointments: PCP for UA, Cards    Exam  Gen: No acute distress, alert and oriented x3,   Heent: NC AT,   Pulm: Lungs clear,   CV: Heart with regular rate and rhythm, murmur noted, S1, S2 heard   Abd: Abdomen soft, nontender, nondistended,   MSK: Trace sigifredo CHF/NICMP with Recovered EF  #TR-mod-Severe  #S/P ICD BSC  -2019 thallium with normal perfusion and EF 34%  -2020 echo with EF 50% with mild MR, moderate-severe TR  -Chest x-rays with bilateral effusions, bilateral lower extremity edema, elevated BNP  -IV Med list     Medication List      CONTINUE taking these medications    acetaminophen 500 MG Tabs  Commonly known as: TYLENOL EXTRA STRENGTH     aspirin 81 MG Tbec     atorvastatin 10 MG Tabs  Commonly known as: LIPITOR     Bimatoprost 0.01 % Soln  Comm [id number]  [phone]                         DC instructions: Other Discharge Instructions:       Keep all follow up appointments and continue all current medications.      Patient had opportunity to ask questions and state understand and agree wit that patients with this level of impairment may benefit from sub-acute rehab. DISCHARGE RECOMMENDATIONS  PT Discharge Recommendations: Sub-acute rehabilitation     PLAN  PT Treatment Plan: Bed mobility;  Family education; Patient education; Gait training Goals to be met by: 9/18/21  Patient Goal Patient's self-stated goal is: to go home   Goal #1 Patient is able to demonstrate supine - sit EOB @ level: minimum assistance      Goal #1   Current Status NT   Goal #2 Patient is able to demonstrate transfer transfers w/ SBA. Pt amb 60ft x 1 w/ RW & SBA. Pt declined amb further. The patient's Approx Degree of Impairment: 50.57% has been calculated based on documentation in the [facility] '6 clicks' Inpatient Basic Mobility Short Form.   Research supports that pat (/)  Stoop/Curb Assistance: Not tested  Comment :  (/)        Patient End of Session: Up in chair; Needs met; With [facility] staff; Call light within reach; RN aware of session/findings;  All patient questions and concerns addressed    CURRENT GOALS     Goals to b O2 saturation 84% on 0.5L. HR 98 bpm  Vitals w/ activity: O2 saturation 87-89% on 1L.  HR 99 bpm   RN Coty notified of the above    The patient's Approx Degree of Impairment: 50.57% has been calculated based on documentation in the [facility] '6 clicks' Inpatien 3-5 steps with a railing?: A Lot     AM-PAC Score:  Raw Score: 17   Approx Degree of Impairment: 50.57%   Standardized Score (AM-PAC Scale): 42.13   CMS Modifier (G-Code): CK    FUNCTIONAL ABILITY STATUS  Gait Assessment   Gait Assistance:  (SBA)  Distance Order: IP Consult to Occupational Therapy  Reason for Therapy: ADL/IADL Dysfunction and Discharge Planning    OCCUPATIONAL THERAPY ASSESSMENT     Patient is a 80year old female admitted 9/10/2021 for hypoxia from rehab .   In this OT evaluation patient pre Oxygen: 88  Ambulation oxygen flow (liters per minute): 2       DISCHARGE RECOMMENDATIONS  OT Discharge Recommendations: Sub-acute rehabilitation       PLAN  OT Treatment Plan: Balance activities;  ADL training; Energy conservation/work simplification techn Putting on and taking off regular upper body clothing?: A Little  -   Taking care of personal grooming such as brushing teeth?: A Little  -   Eating meals?: A Little    AM-PAC Score:  Score: 17  Approx Degree of Impairment: 50.11%  Standardized Score (AM- I&Os  -Use the IS/ cough and deep breath  - See additional Care Plan goals for specific interventions

## (undated) NOTE — LETTER
[facility], [place]  Authorization for Invasive Procedures  1.  I hereby authorize Dr. Dorcas Sung , my physician and whomever may be designated as the doctor's assistant, to perform the following operation and/or procedure:  *** on Daniela reactions, hemolytic reactions, transmission of disease such as hepatitis, AIDS, cytomegalovirus (CMV), and flluid overload.  In the event that I wish to have autologous transfusions of my own blood, or a directed donor transfusion, I will discuss this with ________________________________________________ Date: _________Time: _________    Responsible person in case of minor or unconscious: _____________________________Relationship: ____________     Witness Signature: _________________________________________

## (undated) NOTE — IP AVS SNAPSHOT
Patient Demographics     Address  [address] [place] [address] Phone  [phone] [facility]  [phone] [facility] E-mail Address  [email or web address]      Emergency Contact(s)     Name Relation Home Work Mobile    Demian Dejesus [phone] total) by mouth every 8 (eight) hours. Carlton Jung MD         Bimatoprost 0.01 % Soln  Commonly known as: LUMIGAN  Next dose due: Take tonight      Inject 1 drop into the eye nightly.           bumetanide 2 MG Tabs  Commonly known as: BUMEX  Next dose due: (last 24 hrs)    ** SITE UNKNOWN **     Order ID Medication Name Action Time Action Reason Comments    [id number] Levothyroxine Sodium tab 75 mcg 07/24/21 0544 Given      [id number] Pantoprazole Sodium (PROTONIX) EC tab 40 mg 07/24/21 0544 Given      [id number] Pen (NOVOLOG) 100 UNIT/ML flexpen 1-7 Units 07/23/21 1716 Given      [id number] Insulin Aspart Pen (NOVOLOG) 100 UNIT/ML flexpen 1-7 Units 07/24/21 1313 Given              Recent Vital Signs       Most Recent Value   Vitals  137/73 Filed at 07/24/2021 1424 Lab ([facility])   BUN 94 7 - 18 mg/dL [facility] [place])   Creatinine 1.79 0.55 - 1.02 mg/dL [facility])   BUN/CREA Ratio 52.5 10.0 - 20.0 [facility] [place] [facility] ([facility])   Calcium, Total 8.5 8.5 - 10.1 mg/dL [employer] [facility])   Calculated Osmolality 316 275 Status: Final result Updated: 07/23/21 1406    Specimen: Stool      Occult Blood Negative    Urine Culture, Routine [[id number]]  (Abnormal)  (Susceptibility) Collected: 07/20/21 1646    Order Status: Completed Lab Status: Final result Updated: 07/22/21 061 rami fractures, chronic right femoral neck fx  -CT A/P with Pelvic Fluid collection suggestive of possible intramuscular hematoma, soft tissue swelling right lateral hip  -CT face Soft tissue injury overlying the right mandible, no fx or dislocations  -CT lopressor 12.5 mg bid, zocor 20 mg nightly.  Per son off metolazone 2.5 mg daily since last week  -continue home meds except hold lisinopril and metolazone  -follows with Dr Mary Jane Yuan    #Pancreatic Lesion  -noted on previous imaging 2020-Increase in size be more anemic. Per son pt had blood in urine about 1 week ago but none since then. Yesterday he took pt for dentures then podiatry and then was on way to eye specialist when pt stopped at restaurant for her to urinate.  She got out of the car and was using Bimatoprost 0.01 % Ophthalmic Solution, Inject 1 drop into the eye nightly., Disp: , Rfl:   bumetanide 2 MG Oral Tab, Take 2 mg by mouth 2 (two) times daily. , Disp: , Rfl:   cyanocobalamin 1000 MCG Oral Tab, Take 1,000 mcg by mouth every 30 (thirty) days BRAIN OR HEAD (30504)    Result Date: 7/20/2021  CONCLUSION:  1. No acute intracranial process by noncontrast CT technique.  2. Nonspecific white matter changes involving both cerebral hemispheres that most likely reflect sequelae of chronic microangiopathy 11.7 cm left lower pole exophytic cyst.  Some of the cysts are hyperdense and may relate to proteinaceous/hemorrhagic cysts, but are incompletely characterized on this non-contrast exam. 4. Scattered additional cystic pancreatic lesions including a dominan mass, CKD stage 4, compression fx, HL, HTN, DM type II with neuropathy, s/p ICD, NICMP/chronic systolic chf[GB. 1] with normalized EF, CAD-mild, carotid disease, right Subclavian stenosis,[GB.2] Paf, PAD, HL, hypothyroidism, pulmonary hnt, vitamin d def[GB. intramuscular hematoma[GB.4]  -prn pain meds  -PT/OT/SW[GB.2]  -ortho consult[GB.4]     #Acute on Chronic A[GB. 2]nemia of CKD  -[GB. 1]baseline hgb ~9's with recent decline over last month to 7/s  -[GB. 5]admission hgb 6.4-->1 unit-->7-->1 unit additional H&P signed by Paul Ludwig NP at 7/21/2021 10:34 AM  Version 6 of 7    Author: Paul Ludwig NP Service: Hospitalist Author Type: Nurse Practitioner    Filed: 7/21/2021 10:34 AM Date of Service: 7/21/2021  9:40 AM Status: Addendum    : Be #Acute on Chronic A[GB. 2]nemia of CKD  -[GB. 1]baseline hgb ~9's with recent decline over last month to 7/s  -[GB. 5]admission hgb 6.4-->1 unit-->7-->1 unit additional    -Recent hx of possible gi bleed and well as hematuria  -UA without RBC, OB stool pendin #Rhinitis  -continue flonase    #PAD-Carotid Disease and Right Subclavian Stenosis  -continue statin    GOC  -pt was DNR but per son and his mother they decided to recind this with her hospitalization, discussed with with her son and rec we keep code statu Ht 4' 11\" (1.499 m)   Wt 133 lb (60.3 kg)   SpO2 96%   BMI 26.86 kg/m²     GENERAL: no apparent distress  NEUROLOGIC: A/A; Ox3  SKIN: no rashes  HEENT:[GB.1] right cheek bruise[GB. 2]  NECK: supple  RESPIRATORY: normal expansion; non labored, CTA   CARDIOV (two) times daily as needed. , Disp: , Rfl:   ergocalciferol 1.25 MG (56460 UT) Oral Cap, Take 50,000 Units by mouth every 30 (thirty) days. , Disp: , Rfl:   Fluticasone Propionate 50 MCG/ACT Nasal Suspension, 1 spray by Nasal route daily. , Disp: , Rfl:   in Punctate 2 mm nonobstructing bilateral submandibular sialoliths. 3. Bilateral carotid bifurcation atherosclerosis. 4. Lesser incidental findings as above.    Dictated by (CST): Janice Neal MD on 7/20/2021 at 6:09 PM     Finalized by (CST): Janice Neal level. 7. Lesser incidental findings as above.    Dictated by (CST): Hayder Motta MD on 7/20/2021 at 6:13 PM     Finalized by (CST): Hayder Motta MD on 7/20/2021 at 6:28 PM          XR CHEST AP PORTABLE  (CPT=71045)    Result Date: 7/20/2021  CONCLUSI [facility] Hospitalist Team  History and Physical  Admit Date:[GB.1]  7/20/21[GB. 2]     ASSESSMENT / PLAN:[GB.1]   81 yo female w[GB.2]ho lives with her son w[GB. 3]ith hx[GB. 2] OP, bladder mass, CKD stage 4, compression fx, HL, HTN, DM type II with neuropathy recently as high as 3.1 with improvement to 2.6 on 7/14 as outpt  -admission Cr 2.5-->2.18  -continue bumex, hold ACE and zaroxolyn  -tx for UTI    #Atrial Fibrillation, Parosymal  -tele  -EKG with A-V pacing  -hold eliquis for now   -continue lopressor LISA      Concerns regarding plan of care were discussed with patient. Patient agrees with plan as detailed above.  Discussed plan of care with [GB.1] Tacho[GB.2]    Timoteo Schulte RN, NP  [facility] Hospitalist Team  Pager [phone]  A cancer ([facility] [address]    • Diabetes [facility])    • Essential hypertension    • Hyperlipidemia    • Hypothyroidism         [facility]  Past Surgical History:   Procedure Laterality Date   • CARDIAC DEFIBRILLATOR PLACEMENT  07/07/2021    originally placed 2010 - changed 7/7/21 Alcohol use: Not on file       Fam Hx  History reviewed. No pertinent family history. Review of Systems  A comprehensive 10 point review of systems was completed. Pertinent positives and negatives noted in the the HPI.       DIAGNOSTIC DATA:   CBC/Chem anterior pelvis/retropubic space of Retzius, which likely relates to an associated hematoma. Also noted is asymmetric enlargement of the right pelvic sidewall/ musculature, which raises suspicion for additional intramuscular hematoma formation.   Samella Marrow Alberto Espinosa MD on 7/20/2021 at 7:44 PM          XR HIP W OR WO PELVIS 2 OR 3 VIEWS, RIGHT (CPT=73502)    Result Date: 7/20/2021  CONCLUSION:  1. Acute mildly displaced right superior and inferior pubic rami fractures.  2. Subtle chronic right femoral neck frac below for details    #S/P Fall with[GB.2] Pelvic Fracture[GB. 1] and Soft Tissue Injury Right Mandible  -xray hip with Acute mildly displaced right superior and inferior pubic rami fractures, chronic right femoral neck fx  -CT A/P with Pelvic Fluid collecti lisinopril 5 mg daily, lopressor 12.5 mg bid, zocor 20 mg nightly.  Per son off metolazone 2.5 mg daily since last week  -continue home meds except hold lisinopril and metolazone  -follows with Dr Wallace Valencia    #Pancreatic Lesion  -noted on previous imaging per son pt was noted to be more anemic. Per son pt had blood in urine about 1 week ago but none since then. Yesterday he took pt for dentures then podiatry and then was on way to eye specialist when pt stopped at restaurant for her to urinate.  She got out mouth daily. , Disp: , Rfl:   Bimatoprost 0.01 % Ophthalmic Solution, Inject 1 drop into the eye nightly., Disp: , Rfl:   bumetanide 2 MG Oral Tab, Take 2 mg by mouth 2 (two) times daily. , Disp: , Rfl:   cyanocobalamin 1000 MCG Oral Tab, Take 1,000 mcg by m ALB 2.7*     Radiology: CT BRAIN OR HEAD (61657)    Result Date: 7/20/2021  CONCLUSION:  1. No acute intracranial process by noncontrast CT technique.  2. Nonspecific white matter changes involving both cerebral hemispheres that most likely reflect sequelae cysts including a dominant 11.7 cm left lower pole exophytic cyst.  Some of the cysts are hyperdense and may relate to proteinaceous/hemorrhagic cysts, but are incompletely characterized on this non-contrast exam. 4. Scattered additional cystic pancreatic Danni Olmstead NP on 7/21/2021  8:46 AM  GB. 3 - Danni Wild Do NP on 7/21/2021  9:45 AM  GB. 4 - Danni Wild Do, NP on 7/21/2021  9:53 AM  GB. 5 - Cassidy Contreras NP on 7/21/2021  9:43 AM               H&P signed by Cassidy Contreras NP at 7/21/2021  9:45 AM  Ve Proteinaceous/hemorrhagic  -UA nitrite positive, large LE, WBC 6-10, 1+bacteria. cx pending  -ceftriaxone    #Acute on Chronic A[GB. 2]nemia of CKD  -recent hgb 7's[GB.1], admission hgb 6.4-->1 unit-->7-->1 unit additional    -Recent hx of possible gi bleed -continue statin    GOC  -pt was DNR but per son and his mother they decided to recind this with her hospitalization, discussed with with her son and rec we keep code status of DNR, he will readdress with his mother upon arrival this am  -[address] GENERAL: no apparent distress  NEUROLOGIC: A/A; Ox3  SKIN: no rashes  HEENT:[GB.1] right cheek bruise[GB. 2]  NECK: supple  RESPIRATORY: normal expansion; non labored, CTA   CARDIOVASCULAR: regular,nl S1 S2  ABDOMEN:  Soft, BS+; non distended, non tender Oral Cap, Take 50,000 Units by mouth every 30 (thirty) days. , Disp: , Rfl:   Fluticasone Propionate 50 MCG/ACT Nasal Suspension, 1 spray by Nasal route daily. , Disp: , Rfl:   insulin glargine (LANTUS SOLOSTAR) 100 UNIT/ML Subcutaneous Solution Pen-injector carotid bifurcation atherosclerosis. 4. Lesser incidental findings as above.    Dictated by (CST): Jakob Martin MD on 7/20/2021 at 6:09 PM     Finalized by (CST): Jakob Martin MD on 7/20/2021 at 6:13 PM          CT ABDOMEN+PELVIS(CPT=74176)    Result Kajal Michelle MD on 7/20/2021 at [address] by (CST): Gus Young MD on 7/20/2021 at 6:28 PM          XR CHEST AP PORTABLE  (CPT=71045)    Result Date: 7/20/2021  CONCLUSION:  1.  Small left pleural effusion with probable associated compressive at s/p ICD, NICMP/chronic systolic chf[GB. 1] with normalized EF, CAD-mild, carotid disease, right Subclavian stenosis,[GB.2]Paf, PAD, HL, hypothyroidism, pulmonary hnt, vitamin d def[GB.1], b12 def, glaucoma, rhinitis who presents S/P fall with pelvic fx and moderate-severe TR  -per son dry wt 131-133[GB.2]  -CXR with small left pleural effusion with ass. Compressive atelectasis[GB.3]  -home meds-[GB. 1]bumex 2mg bid, lisinopril 5 mg daily, lopressor 12.5 mg bid, zocor 20 mg nightly.  Per son off metolazone 2.5 energy. In addition pt was being diuresed for chf with improvement  In her weight from about 170 to current wt of 131-133. She has been getting blood draws and per son pt was noted to be more anemic.  Per son pt had blood in urine about 1 week ago but none DIARRHEA     Home Medications:  apixaban 2.5 MG Oral Tab, Take 2.5 mg by mouth 2 (two) times daily. , Disp: , Rfl:   aspirin 81 MG Oral Tab EC, Take 81 mg by mouth daily. , Disp: , Rfl:   Bimatoprost 0.01 % Ophthalmic Solution, Inject 1 drop into the eye nig 34.0* 33.0*   * 124*   CREATSERUM 2.50* 2.18*   * 181*   CA 8.1* 7.9*   MG  --  2.3       Recent Labs   Lab 07/21/21  0605   ALT 15   AST 18   ALB 2.7*     Radiology: CT BRAIN OR HEAD ([id number])    Result Date: 7/20/2021  CONCLUSION:  1.  No acut There is also a punctate focus of gas within the urinary bladder, which may relate to recent instrumentation.  3. Numerous left greater than right renal cysts including a dominant 11.7 cm left lower pole exophytic cyst.  Some of the cysts are hyperdense an Electronically signed by John Maier NP on 7/21/2021  9:44 AM   Attribution Key    GB. 1 - John Maier NP on 7/21/2021  7:44 AM  GB. 2 - John Maier NP on 7/21/2021  8:46 AM  GB. 3 - John Maier NP on 7/21/2021  9:43 AM               H&P s 1+bacteria. cx pending  -ceftriaxone    #Acute on Chronic A[GB. 2]nemia of CKD  -recent hgb 7's[GB.1], admission hgb 6.4-->1 unit-->7-->1 unit additional    -Recent hx of possible gi bleed and well as hematuria  -UA without RBC, OB stool pending[GB.2]  -hol rec we keep code status of DNR, he will readdress with his mother upon arrival this am  -[address] [phone][QS. 2]    FEN  -lytes in am  -IVF  -diet-[GB. 1]NPO[GB.2]    Prophy  -SCD[GB.1]  -xarelto on hold[GB. 2]    Dispo  -pending clinical course non labored, CTA   CARDIOVASCULAR: regular,nl S1 S2  ABDOMEN:  Soft, BS+; non distended, non tender    GENITAL/:[GB.1] purewick[GB.2]  EXTREMITIES: no LE edema[GB.1], bruise to right hip[GB. 2]    PMH  Past Medical History:   Diagnosis Date   • Afib ([facility] [address] daily., Disp: , Rfl:   insulin glargine (LANTUS SOLOSTAR) 100 UNIT/ML Subcutaneous Solution Pen-injector, Inject 8 Units into the skin daily. , Disp: , Rfl:         Soc Hx  Social History    Tobacco Use      Smoking status: Never Smoker      Smokeless tobac by (CST): Denny Villatoro MD on 7/20/2021 at 6:13 PM          CT ABDOMEN+PELVIS(CPT=74176)    Result Date: 7/20/2021  CONCLUSION:  1. Acute displaced and comminuted right superior pubic ramus fracture.   Adjacent 8.7 x 2.8 cm ill-defined hyperdense collecti Date: 7/20/2021  CONCLUSION:  1. Small left pleural effusion with probable associated compressive atelectasis at the left lung base. 2. Left chest wall dual lead pacemaker/AICD.    Dictated by (CST): Janice Neal MD on 7/20/2021 at 7:43 PM     Finalized ceftriaxone. She also had low potassium  Which was repleted. PM&R has now been consulted in order to assess patient's functional status and make recommendations for rehabilitation.     ROS:  I have reviewed a comprehensive number of organ systems and they ar chloride 0.9% 100 mL IVPB-ADDV, 1 g, Intravenous, Q24H        Allergies:    Codeine                 DIZZINESS, NAUSEA ONLY  Hydralazine             Coughing    Comment:Hx of hydralazine induced pneumonitis  Penicillins             DIARRHEA    Past Medical Heart: Reg Rate, no edema noted in BLE  Abdomen: soft, non-tender, non-distended. No hepatomegaly appreciated.   Extrems: no clubbing/cyanosis noted in digits or nails  Skin: skin color/turgor appear normal, no subcutaneous nodules appreciated on hands  Ps on 7/23/2021 10:53 AM   Attribution Key    SS. 1 - Rene Ramon MD on 7/23/2021 10:46 AM                     D/C Summary    No notes of this type exist for this encounter.         Physical Therapy Notes (last 72 hours) (Notes from 7/21/2021  3:54 PM natiu required max cues for use of RW, step length, and sequencing. LE exercises: Educated pt on and guided pt through supine and seated LE exercises- ankle pumps, heel slides, hip abd/add, seated marching, knee extension.    Education: Educated pt on the role BP: 122/71  BP Location: Right arm  BP Method: Automatic  Patient Position: Sitting    O2 WALK       AM-PAC '6-Clicks' INPATIENT SHORT FORM - BASIC MOBILITY  How much difficulty does the patient currently have. ..  -   Turning over in bed (including assistance level: minimum assistance   Goal #3   Current Status  2' with mod Ax2 and RW   Goal #4 Patient will negotiate 15 stairs/one curb w/ assistive device and [place]   Goal #4   Current Status  NT   Goal #5 Patient to demonstrate independence with home age and is VERY motivated to get better. Pt received supine. Pt agreeable to therapy on this date. Pt reporting she is very eager to get moving but is very concerned about the pain.    Bed Mobility: Pt transferred supine to sit with mod Ax2, cues for seq discharge. DISCHARGE RECOMMENDATIONS[LD.1]  PT Discharge Recommendations: Acute rehabilitation[LD.2]    PLAN[LD.1]  PT Treatment Plan: Bed mobility; Body mechanics; Endurance; Energy conservation;Patient education; Family education;Gait training;Neuromuscul Extremity: Weight Bearing as Tolerated[LD.2]       PAIN ASSESSMENT[LD.1]  Rating: Unable to rate  Location: R LE  Management Techniques: Activity promotion; Body mechanics;Breathing techniques;Relaxation;Repositioning[LD. 2]    COGNITION  · Overall Cognitive Alternating marching  Ankle pumps  Knee extension  Transfer training[LD.1]    Patient End of Session: Up in chair;Needs met;Call light within reach;RN aware of session/findings; All patient questions and concerns addressed; Family present; Alarm set[LD.2] Occupational Therapy Notes (last 72 hours) (Notes from 7/21/2021  3:54 PM through 7/24/2021  3:54 PM)      Occupational Therapy Note signed by Maame Parham OT at 7/22/2021  3:17 PM  Version 1 of 1    Author: Maame Parham OT Service: — Author Type: Sylwia Saucedo difficulty weight shifting and taking steps but was able to pivot to chair with Mod aX2; discussed with staff to potentially use lift to t/f back to bed; educated on chair exercises, pressure relief strategies, and activity recommendations for inpatient.  Trish Gomez SITUATION  Type of Home: House  Home Layout: Two level  Lives With: [facility] and Equipment: Standard height toilet  Shower/Tub and Equipment: Tub-shower combo; Tub transfer bench             Drives: No         Prior Level of Park:  Mod I to I within reach; All patient questions and concerns addressed; Alarm set    OT Goals  Patient self-stated goal is: to go to rehab      Patient will complete LE dressing with setup  Comment:     Patient will complete toilet transfer with SBA   Comment:     Leandro

## (undated) NOTE — IP AVS SNAPSHOT
[facility]            (For Outpatient Use Only) Initial Admit Date: 7/20/2021   Inpt/Obs Admit Date: Inpt: 7/20/21 / Obs: N/A   Discharge Date:    Ace Killian:  [de-identified]   MRN: [de-identified]   CSN: [id number]   CEID: [id number]        ENC Pt Rel to Subscriber:    Hospital Account Financial Class: Medicare    July 24, 2021